# Patient Record
Sex: MALE | Race: WHITE | NOT HISPANIC OR LATINO | Employment: OTHER | URBAN - METROPOLITAN AREA
[De-identification: names, ages, dates, MRNs, and addresses within clinical notes are randomized per-mention and may not be internally consistent; named-entity substitution may affect disease eponyms.]

---

## 2017-10-24 ENCOUNTER — APPOINTMENT (OUTPATIENT)
Dept: SOCIAL WORK | Facility: CLINIC | Age: 57
End: 2017-10-24

## 2017-10-24 PROCEDURE — 90686 IIV4 VACC NO PRSV 0.5 ML IM: CPT | Performed by: REGISTERED NURSE

## 2020-01-15 ENCOUNTER — OFFICE VISIT (OUTPATIENT)
Dept: URGENT CARE | Facility: PHYSICIAN GROUP | Age: 60
End: 2020-01-15
Payer: COMMERCIAL

## 2020-01-15 VITALS
DIASTOLIC BLOOD PRESSURE: 92 MMHG | HEART RATE: 80 BPM | BODY MASS INDEX: 30.46 KG/M2 | TEMPERATURE: 98.8 F | OXYGEN SATURATION: 97 % | WEIGHT: 201 LBS | RESPIRATION RATE: 16 BRPM | HEIGHT: 68 IN | SYSTOLIC BLOOD PRESSURE: 130 MMHG

## 2020-01-15 DIAGNOSIS — R42 VERTIGO: ICD-10-CM

## 2020-01-15 DIAGNOSIS — R11.0 NAUSEA: ICD-10-CM

## 2020-01-15 PROCEDURE — 99204 OFFICE O/P NEW MOD 45 MIN: CPT | Performed by: PHYSICIAN ASSISTANT

## 2020-01-15 RX ORDER — MECLIZINE HYDROCHLORIDE 25 MG/1
25 TABLET ORAL 3 TIMES DAILY PRN
Qty: 30 TAB | Refills: 0 | Status: ON HOLD
Start: 2020-01-15 | End: 2020-12-31

## 2020-01-15 RX ORDER — ONDANSETRON 4 MG/1
4 TABLET, ORALLY DISINTEGRATING ORAL EVERY 8 HOURS PRN
Qty: 20 TAB | Refills: 0 | Status: SHIPPED | OUTPATIENT
Start: 2020-01-15 | End: 2020-01-15 | Stop reason: SDUPTHER

## 2020-01-15 RX ORDER — ONDANSETRON 4 MG/1
4 TABLET, ORALLY DISINTEGRATING ORAL EVERY 8 HOURS PRN
Qty: 20 TAB | Refills: 0 | Status: ON HOLD
Start: 2020-01-15 | End: 2020-12-31

## 2020-01-15 NOTE — LETTER
January 15, 2020         Patient: Ryan Nunez   YOB: 1960   Date of Visit: 1/15/2020           To Whom it May Concern:    Ryan Nunez was seen in my clinic on 1/15/2020. He may return to work 1/17/20.    If you have any questions or concerns, please don't hesitate to call.        Sincerely,           Crys White P.A.-C.  Electronically Signed

## 2020-01-15 NOTE — PROGRESS NOTES
Chief Complaint   Patient presents with   • Nausea     saw his dentist yesterday started having symptoms after his appointment   • Dizziness       HISTORY OF PRESENT ILLNESS: Patient is a 59 y.o. male who presents today for the following:    Patient comes in for evaluation of vertigo and nausea that started yesterday after a filling at his dentist office.  He states he has had a filling before.  He was not given any gas or anesthesia.  He was not given any injections of medications or oral medications upon discharge.  He has worsening symptoms with head movement.  He was having a difficult time standing at his standing desk, feeling like he had to hold on.  He has not had any vomiting, diarrhea, or fever.  He has not taken any over-the-counter medication.  He denies chest pain, confusion, and slurred speech.    Patient Active Problem List    Diagnosis Date Noted   • Dyslipidemia 02/21/2012   • Elevated fasting glucose 01/20/2012   • ASTHMA 01/20/2012       Allergies:Patient has no known allergies.    Current Outpatient Medications Ordered in Epic   Medication Sig Dispense Refill   • ondansetron (ZOFRAN ODT) 4 MG TABLET DISPERSIBLE Take 1 Tab by mouth every 8 hours as needed for Nausea. 20 Tab 0   • meclizine (ANTIVERT) 25 MG Tab Take 1 Tab by mouth 3 times a day as needed for Vertigo. 30 Tab 0   • meclizine (ANTIVERT) 25 MG Tab Take 1 Tab by mouth 3 times a day as needed. 30 Tab 0   • ondansetron (ZOFRAN ODT) 4 MG TABLET DISPERSIBLE Take 1 Tab by mouth every 8 hours as needed for Nausea/Vomiting. 20 Tab 0   • diazepam (VALIUM) 10 MG tablet Take 0.5-1 Tabs by mouth every 12 hours as needed (dizziness). 20 Tab 0   • Omeprazole Magnesium (PRILOSEC OTC PO) Take  by mouth.       No current Epic-ordered facility-administered medications on file.        History reviewed. No pertinent past medical history.    Social History     Tobacco Use   • Smoking status: Never Smoker   • Smokeless tobacco: Never Used   Substance Use  "Topics   • Alcohol use: No   • Drug use: Never       No family status information on file.     Family History   Problem Relation Age of Onset   • Lung Disease Father        Review of Systems:    Constitutional ROS: No unexpected change in weight, No weakness, No fatigue  Eye ROS: No recent significant change in vision, No eye pain, redness, discharge  Ear ROS: + vertigo  Mouth/Throat ROS: No teeth or gum problems, No bleeding gums, No tongue complaints  Neck ROS: No swollen glands, No significant pain in neck  Pulmonary ROS: No chronic cough, sputum, or hemoptysis, No dyspnea on exertion, No wheezing  Cardiovascular ROS: No diaphoresis, No edema, No palpitations  GI: nausea  Musculoskeletal/Extremities ROS: No peripheral edema, No pain, redness or swelling on the joints  Hematologic/Lymphatic ROS: No chills, No night sweats, No weight loss  Skin/Integumentary ROS: No edema, No evidence of rash, No itching      Exam:  /92   Pulse 80   Temp 37.1 °C (98.8 °F) (Temporal)   Resp 16   Ht 1.727 m (5' 8\")   Wt 91.2 kg (201 lb)   SpO2 97%   General: Well developed, well nourished. No distress.    Eye: PERRL/EOMI; conjunctivae clear, lids normal.  ENMT: Lips without lesions, MMM. Oropharynx is clear. Bilateral TMs are within normal limits.  Neck: No carotid bruits noted.  Pulmonary: Unlabored respiratory effort. Lungs clear to auscultation, no wheezes, no rhonchi.    Cardiovascular: Regular rate and rhythm without murmur.   Neurologic: Grossly nonfocal. No facial asymmetry noted.  No tongue deviation noted.  Negative pronator drift.   strength strong and equal bilaterally.  Lymph: No cervical lymphadenopathy noted.  Skin: Warm, dry, good turgor. No rashes in visible areas.   Psych: Normal mood. Alert and oriented to person, place and time.    Assessment/Plan:  Discussed unknown etiology.  Symptomatic medication provided.  Discussed red flags and ER precautions. Follow up for worsening or persistent " symptoms.  1. Vertigo  meclizine (ANTIVERT) 25 MG Tab   2. Nausea  ondansetron (ZOFRAN ODT) 4 MG TABLET DISPERSIBLE

## 2020-12-15 ENCOUNTER — TELEPHONE (OUTPATIENT)
Dept: SCHEDULING | Facility: IMAGING CENTER | Age: 60
End: 2020-12-15

## 2020-12-31 ENCOUNTER — APPOINTMENT (OUTPATIENT)
Dept: RADIOLOGY | Facility: MEDICAL CENTER | Age: 60
DRG: 313 | End: 2020-12-31
Attending: SURGERY
Payer: COMMERCIAL

## 2020-12-31 ENCOUNTER — APPOINTMENT (OUTPATIENT)
Dept: RADIOLOGY | Facility: MEDICAL CENTER | Age: 60
DRG: 313 | End: 2020-12-31
Attending: EMERGENCY MEDICINE
Payer: COMMERCIAL

## 2020-12-31 ENCOUNTER — HOSPITAL ENCOUNTER (INPATIENT)
Facility: MEDICAL CENTER | Age: 60
LOS: 2 days | DRG: 313 | End: 2021-01-02
Attending: EMERGENCY MEDICINE | Admitting: SURGERY
Payer: COMMERCIAL

## 2020-12-31 ENCOUNTER — APPOINTMENT (OUTPATIENT)
Dept: RADIOLOGY | Facility: MEDICAL CENTER | Age: 60
DRG: 313 | End: 2020-12-31
Attending: NURSE PRACTITIONER
Payer: COMMERCIAL

## 2020-12-31 DIAGNOSIS — S22.43XA MULTIPLE FRACTURES OF RIBS, BILATERAL, INITIAL ENCOUNTER FOR CLOSED FRACTURE: ICD-10-CM

## 2020-12-31 DIAGNOSIS — I26.99 ACUTE PULMONARY EMBOLISM WITHOUT ACUTE COR PULMONALE, UNSPECIFIED PULMONARY EMBOLISM TYPE (HCC): ICD-10-CM

## 2020-12-31 PROBLEM — F10.929 ACUTE ALCOHOL INTOXICATION (HCC): Status: ACTIVE | Noted: 2020-12-31

## 2020-12-31 PROBLEM — K21.9 GERD (GASTROESOPHAGEAL REFLUX DISEASE): Status: ACTIVE | Noted: 2020-12-31

## 2020-12-31 PROBLEM — T14.90XA TRAUMA: Status: ACTIVE | Noted: 2020-12-31

## 2020-12-31 PROBLEM — Z11.9 SCREENING EXAMINATION FOR INFECTIOUS DISEASE: Status: ACTIVE | Noted: 2020-12-31

## 2020-12-31 PROBLEM — Z53.09 CONTRAINDICATION TO DEEP VEIN THROMBOSIS (DVT) PROPHYLAXIS: Status: ACTIVE | Noted: 2020-12-31

## 2020-12-31 PROBLEM — N32.89 BLADDER WALL THICKENING: Status: ACTIVE | Noted: 2020-12-31

## 2020-12-31 LAB
ABO GROUP BLD: NORMAL
ALBUMIN SERPL BCP-MCNC: 4.4 G/DL (ref 3.2–4.9)
ALBUMIN/GLOB SERPL: 1.4 G/DL
ALP SERPL-CCNC: 99 U/L (ref 30–99)
ALT SERPL-CCNC: 58 U/L (ref 2–50)
ANION GAP SERPL CALC-SCNC: 12 MMOL/L (ref 7–16)
APTT PPP: 31.2 SEC (ref 24.7–36)
AST SERPL-CCNC: 39 U/L (ref 12–45)
BASOPHILS # BLD AUTO: 0.4 % (ref 0–1.8)
BASOPHILS # BLD: 0.06 K/UL (ref 0–0.12)
BILIRUB SERPL-MCNC: 1 MG/DL (ref 0.1–1.5)
BLD GP AB SCN SERPL QL: NORMAL
BUN SERPL-MCNC: 14 MG/DL (ref 8–22)
CALCIUM SERPL-MCNC: 9.4 MG/DL (ref 8.5–10.5)
CHLORIDE SERPL-SCNC: 99 MMOL/L (ref 96–112)
CO2 SERPL-SCNC: 25 MMOL/L (ref 20–33)
COVID ORDER STATUS COVID19: NORMAL
CREAT SERPL-MCNC: 1.11 MG/DL (ref 0.5–1.4)
EKG IMPRESSION: NORMAL
EOSINOPHIL # BLD AUTO: 0.02 K/UL (ref 0–0.51)
EOSINOPHIL NFR BLD: 0.1 % (ref 0–6.9)
ERYTHROCYTE [DISTWIDTH] IN BLOOD BY AUTOMATED COUNT: 43.3 FL (ref 35.9–50)
ETHANOL BLD-MCNC: 24.2 MG/DL (ref 0–10)
FLUAV RNA SPEC QL NAA+PROBE: NEGATIVE
FLUBV RNA SPEC QL NAA+PROBE: NEGATIVE
GLOBULIN SER CALC-MCNC: 3.1 G/DL (ref 1.9–3.5)
GLUCOSE SERPL-MCNC: 244 MG/DL (ref 65–99)
HCT VFR BLD AUTO: 49.3 % (ref 42–52)
HGB BLD-MCNC: 17 G/DL (ref 14–18)
IMM GRANULOCYTES # BLD AUTO: 0.1 K/UL (ref 0–0.11)
IMM GRANULOCYTES NFR BLD AUTO: 0.6 % (ref 0–0.9)
INR PPP: 1.03 (ref 0.87–1.13)
LIPASE SERPL-CCNC: 16 U/L (ref 11–82)
LYMPHOCYTES # BLD AUTO: 1.52 K/UL (ref 1–4.8)
LYMPHOCYTES NFR BLD: 9.6 % (ref 22–41)
MCH RBC QN AUTO: 31.7 PG (ref 27–33)
MCHC RBC AUTO-ENTMCNC: 34.5 G/DL (ref 33.7–35.3)
MCV RBC AUTO: 92 FL (ref 81.4–97.8)
MONOCYTES # BLD AUTO: 1.12 K/UL (ref 0–0.85)
MONOCYTES NFR BLD AUTO: 7.1 % (ref 0–13.4)
NEUTROPHILS # BLD AUTO: 12.97 K/UL (ref 1.82–7.42)
NEUTROPHILS NFR BLD: 82.2 % (ref 44–72)
NRBC # BLD AUTO: 0 K/UL
NRBC BLD-RTO: 0 /100 WBC
PLATELET # BLD AUTO: 388 K/UL (ref 164–446)
PMV BLD AUTO: 10.5 FL (ref 9–12.9)
POTASSIUM SERPL-SCNC: 3.9 MMOL/L (ref 3.6–5.5)
PROT SERPL-MCNC: 7.5 G/DL (ref 6–8.2)
PROTHROMBIN TIME: 13.8 SEC (ref 12–14.6)
RBC # BLD AUTO: 5.36 M/UL (ref 4.7–6.1)
RH BLD: NORMAL
RSV RNA SPEC QL NAA+PROBE: NEGATIVE
SARS-COV-2 RNA RESP QL NAA+PROBE: NOTDETECTED
SODIUM SERPL-SCNC: 136 MMOL/L (ref 135–145)
SPECIMEN SOURCE: NORMAL
TROPONIN T SERPL-MCNC: 20 NG/L (ref 6–19)
WBC # BLD AUTO: 15.8 K/UL (ref 4.8–10.8)

## 2020-12-31 PROCEDURE — 93970 EXTREMITY STUDY: CPT

## 2020-12-31 PROCEDURE — 99291 CRITICAL CARE FIRST HOUR: CPT

## 2020-12-31 PROCEDURE — 36415 COLL VENOUS BLD VENIPUNCTURE: CPT

## 2020-12-31 PROCEDURE — 86900 BLOOD TYPING SEROLOGIC ABO: CPT

## 2020-12-31 PROCEDURE — 700117 HCHG RX CONTRAST REV CODE 255: Performed by: EMERGENCY MEDICINE

## 2020-12-31 PROCEDURE — 307740 HCHG GREEN TRAUMA TEAM SERVICES

## 2020-12-31 PROCEDURE — A9270 NON-COVERED ITEM OR SERVICE: HCPCS | Performed by: NURSE PRACTITIONER

## 2020-12-31 PROCEDURE — 71260 CT THORAX DX C+: CPT

## 2020-12-31 PROCEDURE — 85610 PROTHROMBIN TIME: CPT

## 2020-12-31 PROCEDURE — 700102 HCHG RX REV CODE 250 W/ 637 OVERRIDE(OP): Performed by: SURGERY

## 2020-12-31 PROCEDURE — 770022 HCHG ROOM/CARE - ICU (200)

## 2020-12-31 PROCEDURE — 84484 ASSAY OF TROPONIN QUANT: CPT

## 2020-12-31 PROCEDURE — 80307 DRUG TEST PRSMV CHEM ANLYZR: CPT

## 2020-12-31 PROCEDURE — 700102 HCHG RX REV CODE 250 W/ 637 OVERRIDE(OP): Performed by: NURSE PRACTITIONER

## 2020-12-31 PROCEDURE — 93005 ELECTROCARDIOGRAM TRACING: CPT | Performed by: EMERGENCY MEDICINE

## 2020-12-31 PROCEDURE — A9270 NON-COVERED ITEM OR SERVICE: HCPCS | Performed by: SURGERY

## 2020-12-31 PROCEDURE — 72131 CT LUMBAR SPINE W/O DYE: CPT

## 2020-12-31 PROCEDURE — 700111 HCHG RX REV CODE 636 W/ 250 OVERRIDE (IP): Performed by: EMERGENCY MEDICINE

## 2020-12-31 PROCEDURE — 700105 HCHG RX REV CODE 258: Performed by: SURGERY

## 2020-12-31 PROCEDURE — 80053 COMPREHEN METABOLIC PANEL: CPT

## 2020-12-31 PROCEDURE — 700111 HCHG RX REV CODE 636 W/ 250 OVERRIDE (IP): Performed by: SURGERY

## 2020-12-31 PROCEDURE — 70450 CT HEAD/BRAIN W/O DYE: CPT

## 2020-12-31 PROCEDURE — 72128 CT CHEST SPINE W/O DYE: CPT

## 2020-12-31 PROCEDURE — 86850 RBC ANTIBODY SCREEN: CPT

## 2020-12-31 PROCEDURE — 85025 COMPLETE CBC W/AUTO DIFF WBC: CPT

## 2020-12-31 PROCEDURE — 73560 X-RAY EXAM OF KNEE 1 OR 2: CPT | Mod: RT

## 2020-12-31 PROCEDURE — 83690 ASSAY OF LIPASE: CPT

## 2020-12-31 PROCEDURE — 96374 THER/PROPH/DIAG INJ IV PUSH: CPT

## 2020-12-31 PROCEDURE — 86901 BLOOD TYPING SEROLOGIC RH(D): CPT

## 2020-12-31 PROCEDURE — 0241U HCHG SARS-COV-2 COVID-19 NFCT DS RESP RNA 4 TRGT MIC: CPT

## 2020-12-31 PROCEDURE — 51798 US URINE CAPACITY MEASURE: CPT

## 2020-12-31 PROCEDURE — 85730 THROMBOPLASTIN TIME PARTIAL: CPT

## 2020-12-31 PROCEDURE — C9803 HOPD COVID-19 SPEC COLLECT: HCPCS | Performed by: NURSE PRACTITIONER

## 2020-12-31 PROCEDURE — 94760 N-INVAS EAR/PLS OXIMETRY 1: CPT

## 2020-12-31 RX ORDER — SODIUM CHLORIDE, SODIUM LACTATE, POTASSIUM CHLORIDE, CALCIUM CHLORIDE 600; 310; 30; 20 MG/100ML; MG/100ML; MG/100ML; MG/100ML
INJECTION, SOLUTION INTRAVENOUS CONTINUOUS
Status: DISCONTINUED | OUTPATIENT
Start: 2020-12-31 | End: 2021-01-01 | Stop reason: ALTCHOICE

## 2020-12-31 RX ORDER — AMOXICILLIN 250 MG
1 CAPSULE ORAL
Status: DISCONTINUED | OUTPATIENT
Start: 2020-12-31 | End: 2021-01-02 | Stop reason: HOSPADM

## 2020-12-31 RX ORDER — OXYCODONE HYDROCHLORIDE 10 MG/1
10 TABLET ORAL
Status: DISCONTINUED | OUTPATIENT
Start: 2020-12-31 | End: 2021-01-02 | Stop reason: HOSPADM

## 2020-12-31 RX ORDER — KETOROLAC TROMETHAMINE 30 MG/ML
30 INJECTION, SOLUTION INTRAMUSCULAR; INTRAVENOUS EVERY 6 HOURS
Status: DISCONTINUED | OUTPATIENT
Start: 2020-12-31 | End: 2021-01-02 | Stop reason: HOSPADM

## 2020-12-31 RX ORDER — OXYCODONE HYDROCHLORIDE 5 MG/1
5 TABLET ORAL
Status: DISCONTINUED | OUTPATIENT
Start: 2020-12-31 | End: 2021-01-02 | Stop reason: HOSPADM

## 2020-12-31 RX ORDER — ALBUTEROL SULFATE 90 UG/1
2 AEROSOL, METERED RESPIRATORY (INHALATION) EVERY 6 HOURS PRN
COMMUNITY
End: 2021-03-25

## 2020-12-31 RX ORDER — ACETAMINOPHEN 500 MG
1000 TABLET ORAL EVERY 6 HOURS
Status: DISCONTINUED | OUTPATIENT
Start: 2020-12-31 | End: 2021-01-02 | Stop reason: HOSPADM

## 2020-12-31 RX ORDER — ONDANSETRON 2 MG/ML
4 INJECTION INTRAMUSCULAR; INTRAVENOUS EVERY 4 HOURS PRN
Status: DISCONTINUED | OUTPATIENT
Start: 2020-12-31 | End: 2021-01-02 | Stop reason: HOSPADM

## 2020-12-31 RX ORDER — ENEMA 19; 7 G/133ML; G/133ML
1 ENEMA RECTAL
Status: DISCONTINUED | OUTPATIENT
Start: 2020-12-31 | End: 2021-01-02 | Stop reason: HOSPADM

## 2020-12-31 RX ORDER — DOCUSATE SODIUM 100 MG/1
100 CAPSULE, LIQUID FILLED ORAL 2 TIMES DAILY
Status: DISCONTINUED | OUTPATIENT
Start: 2020-12-31 | End: 2021-01-02 | Stop reason: HOSPADM

## 2020-12-31 RX ORDER — AMOXICILLIN 250 MG
1 CAPSULE ORAL NIGHTLY
Status: DISCONTINUED | OUTPATIENT
Start: 2020-12-31 | End: 2021-01-01

## 2020-12-31 RX ORDER — POLYETHYLENE GLYCOL 3350 17 G/17G
1 POWDER, FOR SOLUTION ORAL 2 TIMES DAILY
Status: DISCONTINUED | OUTPATIENT
Start: 2020-12-31 | End: 2021-01-02 | Stop reason: HOSPADM

## 2020-12-31 RX ORDER — MORPHINE SULFATE 4 MG/ML
2 INJECTION, SOLUTION INTRAMUSCULAR; INTRAVENOUS ONCE
Status: ACTIVE | OUTPATIENT
Start: 2020-12-31 | End: 2021-01-01

## 2020-12-31 RX ORDER — FAMOTIDINE 20 MG/1
20 TABLET, FILM COATED ORAL 2 TIMES DAILY
Status: DISCONTINUED | OUTPATIENT
Start: 2020-12-31 | End: 2021-01-02 | Stop reason: HOSPADM

## 2020-12-31 RX ORDER — MORPHINE SULFATE 4 MG/ML
4 INJECTION, SOLUTION INTRAMUSCULAR; INTRAVENOUS
Status: DISCONTINUED | OUTPATIENT
Start: 2020-12-31 | End: 2021-01-02 | Stop reason: HOSPADM

## 2020-12-31 RX ORDER — FAMOTIDINE 20 MG/1
20 TABLET, FILM COATED ORAL 2 TIMES DAILY
Status: DISCONTINUED | OUTPATIENT
Start: 2020-12-31 | End: 2020-12-31

## 2020-12-31 RX ORDER — BISACODYL 10 MG
10 SUPPOSITORY, RECTAL RECTAL
Status: DISCONTINUED | OUTPATIENT
Start: 2020-12-31 | End: 2021-01-02 | Stop reason: HOSPADM

## 2020-12-31 RX ADMIN — ACETAMINOPHEN 1000 MG: 500 TABLET ORAL at 17:13

## 2020-12-31 RX ADMIN — IOHEXOL 100 ML: 350 INJECTION, SOLUTION INTRAVENOUS at 10:20

## 2020-12-31 RX ADMIN — KETOROLAC TROMETHAMINE 30 MG: 30 INJECTION, SOLUTION INTRAMUSCULAR at 12:40

## 2020-12-31 RX ADMIN — KETOROLAC TROMETHAMINE 30 MG: 30 INJECTION, SOLUTION INTRAMUSCULAR at 17:13

## 2020-12-31 RX ADMIN — SODIUM CHLORIDE, POTASSIUM CHLORIDE, SODIUM LACTATE AND CALCIUM CHLORIDE: 600; 310; 30; 20 INJECTION, SOLUTION INTRAVENOUS at 12:35

## 2020-12-31 RX ADMIN — ENOXAPARIN SODIUM 80 MG: 40 INJECTION SUBCUTANEOUS at 17:13

## 2020-12-31 RX ADMIN — KETOROLAC TROMETHAMINE 30 MG: 30 INJECTION, SOLUTION INTRAMUSCULAR at 23:34

## 2020-12-31 RX ADMIN — ACETAMINOPHEN 1000 MG: 500 TABLET ORAL at 12:40

## 2020-12-31 RX ADMIN — SODIUM CHLORIDE, POTASSIUM CHLORIDE, SODIUM LACTATE AND CALCIUM CHLORIDE: 600; 310; 30; 20 INJECTION, SOLUTION INTRAVENOUS at 22:13

## 2020-12-31 RX ADMIN — FAMOTIDINE 20 MG: 20 TABLET ORAL at 17:13

## 2020-12-31 RX ADMIN — ENOXAPARIN SODIUM 80 MG: 40 INJECTION SUBCUTANEOUS at 12:39

## 2020-12-31 RX ADMIN — FENTANYL CITRATE 50 MCG: 50 INJECTION, SOLUTION INTRAMUSCULAR; INTRAVENOUS at 09:49

## 2020-12-31 ASSESSMENT — LIFESTYLE VARIABLES
DO YOU DRINK ALCOHOL: NO
TOTAL SCORE: 0
ON A TYPICAL DAY WHEN YOU DRINK ALCOHOL HOW MANY DRINKS DO YOU HAVE: 0
HAVE YOU EVER FELT YOU SHOULD CUT DOWN ON YOUR DRINKING: NO
TOTAL SCORE: 0
HOW MANY TIMES IN THE PAST YEAR HAVE YOU HAD 5 OR MORE DRINKS IN A DAY: 0
AVERAGE NUMBER OF DAYS PER WEEK YOU HAVE A DRINK CONTAINING ALCOHOL: 0
HAVE PEOPLE ANNOYED YOU BY CRITICIZING YOUR DRINKING: NO
TOTAL SCORE: 0
ALCOHOL_USE: NO
EVER HAD A DRINK FIRST THING IN THE MORNING TO STEADY YOUR NERVES TO GET RID OF A HANGOVER: NO
EVER FELT BAD OR GUILTY ABOUT YOUR DRINKING: NO
CONSUMPTION TOTAL: NEGATIVE

## 2020-12-31 ASSESSMENT — FIBROSIS 4 INDEX: FIB4 SCORE: 0.81

## 2020-12-31 ASSESSMENT — COPD QUESTIONNAIRES
COPD SCREENING SCORE: 1
DURING THE PAST 4 WEEKS HOW MUCH DID YOU FEEL SHORT OF BREATH: NONE/LITTLE OF THE TIME
HAVE YOU SMOKED AT LEAST 100 CIGARETTES IN YOUR ENTIRE LIFE: NO/DON'T KNOW
DO YOU EVER COUGH UP ANY MUCUS OR PHLEGM?: NO/ONLY WITH OCCASIONAL COLDS OR INFECTIONS

## 2020-12-31 ASSESSMENT — PAIN DESCRIPTION - PAIN TYPE: TYPE: ACUTE PAIN

## 2020-12-31 NOTE — ED NOTES
Med Rec completed per patient and wife  Allergies reviewed  No ORAL antibiotics in last 14 days
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
## 2020-12-31 NOTE — ASSESSMENT & PLAN NOTE
BA on arrival 24.2  Alcohol withdrawal surveillance.  Brief intervention completed, pt states it is BA is wrong and he never drinks.
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
## 2020-12-31 NOTE — ASSESSMENT & PLAN NOTE
Mildly thick-walled bladder can be related to obstructive uropathy in the setting of a prominent prostate.  Bladder scan protocol.  1/1/21 voiding.

## 2020-12-31 NOTE — ASSESSMENT & PLAN NOTE
Bilateral rib fractures involving the left fourth, fifth, sixth, seventh and eighth ribs, and right sixth, seventh and possibly eighth ribs.  Supplemental oxygen to keep saturation >93%.  Aggressive pulmonary hygiene and oyxgen saturation monitor.  Incentive spirometer to bedside, encourage Q1hour use while awake.  1/1/21 IS 1000  Serial Chest Xray's.

## 2020-12-31 NOTE — PROGRESS NOTES
1220: assumed care of patient in ED  1231: arrived to ICU    75 SR  164/95  92% on 2L NC  98 Temporal  87.2 KG    9/10 pain // refusing narcotics    _________________    2 RN skin check completed with Dorcas     Areas of concern/Skin observations:  · Abrasion to forehead  · Abrasions to abdomen  · Abrasions to bilateral hands  · Abrasions to bilateral knees, left shin    Devices in use, assessed under and interventions (as appropriate) for skin protection:  · SCDs, BP cuff, SaO2 monitor  · Nasal cannula     Interventions in place such as:   · Pt turning in bed  · Keeping skin clean and dry  · Waffle cushion while OOB: n/a at this time  · Bed Type: Low air loss  · Mobility: stand; EOB    _________________________________    Pt belongings:   · Mask  · Underwear / blue / on patient  · Glasses  · Red fuzzy jacket  · Gray shirt  · White socks  · Phone and white dual   · Wallet / blue  · Black pants  · Comb  · Keys  · Cash - $41.00  · Brown shoes

## 2020-12-31 NOTE — ASSESSMENT & PLAN NOTE
Left lower lobe pulmonary emboli.  DVT studies negative for DVT.  Therapeutic lovenox initiated on admit.

## 2021-01-01 ENCOUNTER — APPOINTMENT (OUTPATIENT)
Dept: RADIOLOGY | Facility: MEDICAL CENTER | Age: 61
DRG: 313 | End: 2021-01-01
Attending: SURGERY
Payer: COMMERCIAL

## 2021-01-01 PROBLEM — Z78.9 NO CONTRAINDICATION TO ANTICOAGULATION THERAPY: Status: ACTIVE | Noted: 2020-12-31

## 2021-01-01 PROBLEM — F10.929 ACUTE ALCOHOL INTOXICATION (HCC): Status: RESOLVED | Noted: 2020-12-31 | Resolved: 2021-01-01

## 2021-01-01 LAB
ABO + RH BLD: NORMAL
ALBUMIN SERPL BCP-MCNC: 3.5 G/DL (ref 3.2–4.9)
ALBUMIN/GLOB SERPL: 1.5 G/DL
ALP SERPL-CCNC: 85 U/L (ref 30–99)
ALT SERPL-CCNC: 44 U/L (ref 2–50)
ANION GAP SERPL CALC-SCNC: 7 MMOL/L (ref 7–16)
AST SERPL-CCNC: 34 U/L (ref 12–45)
BASOPHILS # BLD AUTO: 0.6 % (ref 0–1.8)
BASOPHILS # BLD: 0.05 K/UL (ref 0–0.12)
BILIRUB SERPL-MCNC: 0.9 MG/DL (ref 0.1–1.5)
BUN SERPL-MCNC: 12 MG/DL (ref 8–22)
CALCIUM SERPL-MCNC: 8.5 MG/DL (ref 8.5–10.5)
CHLORIDE SERPL-SCNC: 103 MMOL/L (ref 96–112)
CO2 SERPL-SCNC: 28 MMOL/L (ref 20–33)
CREAT SERPL-MCNC: 1.06 MG/DL (ref 0.5–1.4)
EOSINOPHIL # BLD AUTO: 0.16 K/UL (ref 0–0.51)
EOSINOPHIL NFR BLD: 1.9 % (ref 0–6.9)
ERYTHROCYTE [DISTWIDTH] IN BLOOD BY AUTOMATED COUNT: 43.9 FL (ref 35.9–50)
GLOBULIN SER CALC-MCNC: 2.3 G/DL (ref 1.9–3.5)
GLUCOSE SERPL-MCNC: 143 MG/DL (ref 65–99)
HCT VFR BLD AUTO: 43.6 % (ref 42–52)
HGB BLD-MCNC: 14.7 G/DL (ref 14–18)
IMM GRANULOCYTES # BLD AUTO: 0.03 K/UL (ref 0–0.11)
IMM GRANULOCYTES NFR BLD AUTO: 0.4 % (ref 0–0.9)
LYMPHOCYTES # BLD AUTO: 1.97 K/UL (ref 1–4.8)
LYMPHOCYTES NFR BLD: 23.6 % (ref 22–41)
MCH RBC QN AUTO: 31.7 PG (ref 27–33)
MCHC RBC AUTO-ENTMCNC: 33.7 G/DL (ref 33.7–35.3)
MCV RBC AUTO: 94 FL (ref 81.4–97.8)
MONOCYTES # BLD AUTO: 0.72 K/UL (ref 0–0.85)
MONOCYTES NFR BLD AUTO: 8.6 % (ref 0–13.4)
NEUTROPHILS # BLD AUTO: 5.4 K/UL (ref 1.82–7.42)
NEUTROPHILS NFR BLD: 64.9 % (ref 44–72)
NRBC # BLD AUTO: 0 K/UL
NRBC BLD-RTO: 0 /100 WBC
PLATELET # BLD AUTO: 241 K/UL (ref 164–446)
PMV BLD AUTO: 10.7 FL (ref 9–12.9)
POTASSIUM SERPL-SCNC: 4.2 MMOL/L (ref 3.6–5.5)
PROT SERPL-MCNC: 5.8 G/DL (ref 6–8.2)
RBC # BLD AUTO: 4.64 M/UL (ref 4.7–6.1)
SODIUM SERPL-SCNC: 138 MMOL/L (ref 135–145)
WBC # BLD AUTO: 8.3 K/UL (ref 4.8–10.8)

## 2021-01-01 PROCEDURE — 85025 COMPLETE CBC W/AUTO DIFF WBC: CPT

## 2021-01-01 PROCEDURE — 700102 HCHG RX REV CODE 250 W/ 637 OVERRIDE(OP): Performed by: SURGERY

## 2021-01-01 PROCEDURE — 71045 X-RAY EXAM CHEST 1 VIEW: CPT

## 2021-01-01 PROCEDURE — 80053 COMPREHEN METABOLIC PANEL: CPT

## 2021-01-01 PROCEDURE — 700111 HCHG RX REV CODE 636 W/ 250 OVERRIDE (IP): Performed by: SURGERY

## 2021-01-01 PROCEDURE — A9270 NON-COVERED ITEM OR SERVICE: HCPCS | Performed by: SURGERY

## 2021-01-01 PROCEDURE — 99232 SBSQ HOSP IP/OBS MODERATE 35: CPT | Performed by: SURGERY

## 2021-01-01 PROCEDURE — 700102 HCHG RX REV CODE 250 W/ 637 OVERRIDE(OP): Performed by: NURSE PRACTITIONER

## 2021-01-01 PROCEDURE — A9270 NON-COVERED ITEM OR SERVICE: HCPCS | Performed by: NURSE PRACTITIONER

## 2021-01-01 PROCEDURE — 770001 HCHG ROOM/CARE - MED/SURG/GYN PRIV*

## 2021-01-01 RX ADMIN — FAMOTIDINE 20 MG: 20 TABLET ORAL at 05:13

## 2021-01-01 RX ADMIN — CHOLECALCIFEROL (VITAMIN D3) 10 MCG (400 UNIT) TABLET 400 UNITS: at 05:14

## 2021-01-01 RX ADMIN — ACETAMINOPHEN 1000 MG: 500 TABLET ORAL at 05:13

## 2021-01-01 RX ADMIN — ENOXAPARIN SODIUM 80 MG: 40 INJECTION SUBCUTANEOUS at 05:14

## 2021-01-01 RX ADMIN — KETOROLAC TROMETHAMINE 30 MG: 30 INJECTION, SOLUTION INTRAMUSCULAR at 05:14

## 2021-01-01 RX ADMIN — ACETAMINOPHEN 1000 MG: 500 TABLET ORAL at 15:05

## 2021-01-01 RX ADMIN — ACETAMINOPHEN 1000 MG: 500 TABLET ORAL at 22:16

## 2021-01-01 RX ADMIN — THERA TABS 1 TABLET: TAB at 05:13

## 2021-01-01 RX ADMIN — ENOXAPARIN SODIUM 80 MG: 40 INJECTION SUBCUTANEOUS at 19:29

## 2021-01-01 ASSESSMENT — ENCOUNTER SYMPTOMS
NAUSEA: 0
SPEECH CHANGE: 0
FEVER: 0
BLURRED VISION: 0
MYALGIAS: 1
VOMITING: 0
SENSORY CHANGE: 0
ABDOMINAL PAIN: 0

## 2021-01-01 ASSESSMENT — PAIN DESCRIPTION - PAIN TYPE
TYPE: ACUTE PAIN

## 2021-01-01 ASSESSMENT — LIFESTYLE VARIABLES: SUBSTANCE_ABUSE: 1
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
## 2021-01-02 ENCOUNTER — APPOINTMENT (OUTPATIENT)
Dept: RADIOLOGY | Facility: MEDICAL CENTER | Age: 61
DRG: 313 | End: 2021-01-02
Attending: SURGERY
Payer: COMMERCIAL

## 2021-01-02 ENCOUNTER — PHARMACY VISIT (OUTPATIENT)
Dept: PHARMACY | Facility: MEDICAL CENTER | Age: 61
End: 2021-01-02
Payer: COMMERCIAL

## 2021-01-02 VITALS
RESPIRATION RATE: 18 BRPM | TEMPERATURE: 97.6 F | DIASTOLIC BLOOD PRESSURE: 101 MMHG | HEIGHT: 68 IN | WEIGHT: 194.45 LBS | OXYGEN SATURATION: 92 % | HEART RATE: 86 BPM | BODY MASS INDEX: 29.47 KG/M2 | SYSTOLIC BLOOD PRESSURE: 149 MMHG

## 2021-01-02 PROCEDURE — 700102 HCHG RX REV CODE 250 W/ 637 OVERRIDE(OP): Performed by: SURGERY

## 2021-01-02 PROCEDURE — 71045 X-RAY EXAM CHEST 1 VIEW: CPT

## 2021-01-02 PROCEDURE — A9270 NON-COVERED ITEM OR SERVICE: HCPCS | Performed by: SURGERY

## 2021-01-02 PROCEDURE — 700102 HCHG RX REV CODE 250 W/ 637 OVERRIDE(OP): Performed by: NURSE PRACTITIONER

## 2021-01-02 PROCEDURE — RXMED WILLOW AMBULATORY MEDICATION CHARGE: Performed by: NURSE PRACTITIONER

## 2021-01-02 PROCEDURE — 700111 HCHG RX REV CODE 636 W/ 250 OVERRIDE (IP): Performed by: SURGERY

## 2021-01-02 PROCEDURE — A9270 NON-COVERED ITEM OR SERVICE: HCPCS | Performed by: NURSE PRACTITIONER

## 2021-01-02 PROCEDURE — 700101 HCHG RX REV CODE 250: Performed by: SURGERY

## 2021-01-02 RX ORDER — ACETAMINOPHEN 500 MG
1000 TABLET ORAL EVERY 6 HOURS
Qty: 30 TAB | Refills: 0 | COMMUNITY
Start: 2021-01-02 | End: 2021-03-25

## 2021-01-02 RX ORDER — OXYCODONE HYDROCHLORIDE 5 MG/1
5 TABLET ORAL EVERY 6 HOURS PRN
Qty: 10 TAB | Refills: 0 | Status: SHIPPED | OUTPATIENT
Start: 2021-01-02 | End: 2021-01-05

## 2021-01-02 RX ADMIN — KETOROLAC TROMETHAMINE 30 MG: 30 INJECTION, SOLUTION INTRAMUSCULAR at 05:44

## 2021-01-02 RX ADMIN — DOCUSATE SODIUM 100 MG: 100 CAPSULE, LIQUID FILLED ORAL at 05:44

## 2021-01-02 RX ADMIN — ENOXAPARIN SODIUM 80 MG: 40 INJECTION SUBCUTANEOUS at 05:42

## 2021-01-02 RX ADMIN — ACETAMINOPHEN 1000 MG: 500 TABLET ORAL at 11:41

## 2021-01-02 RX ADMIN — ACETAMINOPHEN 1000 MG: 500 TABLET ORAL at 05:43

## 2021-01-02 RX ADMIN — FAMOTIDINE 20 MG: 20 TABLET ORAL at 05:42

## 2021-01-02 RX ADMIN — MAGNESIUM HYDROXIDE 30 ML: 400 SUSPENSION ORAL at 05:44

## 2021-01-02 RX ADMIN — THERA TABS 1 TABLET: TAB at 05:42

## 2021-01-02 RX ADMIN — KETOROLAC TROMETHAMINE 30 MG: 30 INJECTION, SOLUTION INTRAMUSCULAR at 11:42

## 2021-01-02 RX ADMIN — CHOLECALCIFEROL (VITAMIN D3) 10 MCG (400 UNIT) TABLET 400 UNITS: at 05:42

## 2021-01-02 RX ADMIN — POLYETHYLENE GLYCOL 3350 1 PACKET: 17 POWDER, FOR SOLUTION ORAL at 05:44

## 2021-01-02 ASSESSMENT — PAIN DESCRIPTION - PAIN TYPE
TYPE: ACUTE PAIN
TYPE: ACUTE PAIN

## 2021-01-02 ASSESSMENT — ENCOUNTER SYMPTOMS
NEUROLOGICAL NEGATIVE: 1
PSYCHIATRIC NEGATIVE: 1
RESPIRATORY NEGATIVE: 1
MYALGIAS: 1

## 2021-01-02 ASSESSMENT — FIBROSIS 4 INDEX: FIB4 SCORE: 1.3

## 2021-01-02 NOTE — PROGRESS NOTES
2 RN skin check complete.   Devices in place: NA.   Skin assessed under devices: NA.     Scattered bruising/abrasion throughout. Most notable scabbing/bruising/abrasion to right anterior forehead, right knee. Left hand swelling noted. Right buttocks small crescent shaped bruising; blanching. Dry, cracked heel.    Confirmed pressure ulcers found on: NA.   New potential pressure ulcers noted on: NA.    Wound consult placed: NA.     The following interventions in place: Pillows for limb positioning.

## 2021-01-02 NOTE — PROGRESS NOTES
Received report from ICU RN; assumed care. Patient arrived via  with transport at approximately 0200 a.m.. Patient up SBA with steady gait, steady gait noted. A&O x 4. VSS. 93% on RA upon initial. Patient c/o mld SOB, 1L NC added due to fluctuating saturations. Patient denied numbness, tingling, nausea, vomiting. Scattered abrasions noted throughout; see skin note PN. Abdomen round, distended. Hypoactive BS x 4. Neuro check performed, noted swaying with scanning, patient grabbed rails with PERRLA assessment. Redden hue to skin. + void per report. + flatus. LBM 01/01/2021. Patient oriented to floor/routine/call light usage. POC discussed. Questions answered. Bed alarm on/engaged. Call light/personal belongings within reach. All needs met/patient resting at present time.

## 2021-01-02 NOTE — CARE PLAN
Problem: Communication  Goal: The ability to communicate needs accurately and effectively will improve  Outcome: PROGRESSING AS EXPECTED  Patient able to articulate needs. Call light use demonstrated.     Problem: Safety  Goal: Will remain free from injury  Outcome: PROGRESSING AS EXPECTED  Educated about fall risk. Request patient let staff know when getting OOB.     Problem: Venous Thromboembolism (VTW)/Deep Vein Thrombosis (DVT) Prevention:  Goal: Patient will participate in Venous Thrombosis (VTE)/Deep Vein Thrombosis (DVT)Prevention Measures  Outcome: PROGRESSING AS EXPECTED  Discussed PE/thrombosis and risks. Verbalized understanding.     Problem: Pain Management  Goal: Pain level will decrease to patient's comfort goal  Outcome: PROGRESSING AS EXPECTED  Patient tolerating, requesting Tylenol only. Avoiding opioids consciously.
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
## 2021-01-02 NOTE — PROGRESS NOTES
Report given to TEAGAN Hutson. Transport to take patient to [place] [id number] via wheelchair. Personal items and chart with patient.

## 2021-01-02 NOTE — DISCHARGE PLANNING
Meds-to-Beds: Discharge prescription orders listed below delivered to patient's bedside by Felipa. RN notified. Patient counseled by phone.       Ryan Nunez   Home Medication Instructions BLANQUITA:[id number]    Printed on:01/02/21 1514   Medication Information                      oxyCODONE immediate-release (ROXICODONE) 5 MG Tab  Take 1 Tab by mouth every 6 hours as needed for up to 7 days.             rivaroxaban (XARELTO) 15 MG Tab tablet  Take 1 Tab by mouth 2 times a day, with meals.             rivaroxaban (XARELTO) 20 MG Tab tablet  Take 1 Tab by mouth with dinner.                 Susanne Gonzalez, PharmD

## 2021-01-02 NOTE — PROGRESS NOTES
Pt is refusing the pulse ox machine, he states he doesn't need to wear it. I educated him and he is still refusing it. Pt also wants to continue to walk around the room without assistance. I told him he needed to call for help and wait for someone to assist him. He states in ICU they let him walk around on his own and he doesn't feel like he needs our help. I educated him on fall risks and he states he understands but wants to continue to walk around on his own.

## 2021-01-02 NOTE — DISCHARGE INSTRUCTIONS
Rivaroxaban oral tablets  What is this medicine?  RIVAROXABAN (ri va ELLE a ban) is an anticoagulant (blood thinner). It is used to treat blood clots in the lungs or in the veins. It is also used to prevent blood clots in the lungs or in the veins. It is also used to lower the chance of stroke in people with a medical condition called atrial fibrillation.  This medicine may be used for other purposes; ask your health care provider or pharmacist if you have questions.  COMMON BRAND NAME(S): Xarelto, Xarelto Starter Pack  What should I tell my health care provider before I take this medicine?  They need to know if you have any of these conditions:  · antiphospholipid antibody syndrome  · artificial heart valve  · bleeding disorders  · bleeding in the brain  · blood in your stools (black or tarry stools) or if you have blood in your vomit  · history of blood clots  · history of stomach bleeding  · kidney disease  · liver disease  · low blood counts, like low white cell, platelet, or red cell counts  · recent or planned spinal or epidural procedure  · take medicines that treat or prevent blood clots  · an unusual or allergic reaction to rivaroxaban, other medicines, foods, dyes, or preservatives  · pregnant or trying to get pregnant  · breast-feeding  How should I use this medicine?  Take this medicine by mouth with a glass of water. Follow the directions on the prescription label. Take your medicine at regular intervals. Do not take it more often than directed. Do not stop taking except on your doctor's advice. Stopping this medicine may increase your risk of a blood clot. Be sure to refill your prescription before you run out of medicine.  If you are taking this medicine after hip or knee replacement surgery, take it with or without food. If you are taking this medicine for atrial fibrillation, take it with your evening meal. If you are taking this medicine to treat blood clots, take it with food at the same time each  day. If you are unable to swallow your tablet, you may crush the tablet and mix it in applesauce. Then, immediately eat the applesauce. You should eat more food right after you eat the applesauce containing the crushed tablet.  Talk to your pediatrician regarding the use of this medicine in children. Special care may be needed.  Overdosage: If you think you have taken too much of this medicine contact a poison control center or emergency room at once.  NOTE: This medicine is only for you. Do not share this medicine with others.  What if I miss a dose?  If you take your medicine once a day and miss a dose, take the missed dose as soon as you remember. If it is almost time for your next dose, take only that dose. Do not take double or extra doses.  If you take your medicine twice a day and miss a dose, take the missed dose immediately. In this instance, 2 tablets may be taken at the same time. The next day you should take 1 tablet twice a day as directed.  What may interact with this medicine?  Do not take this medicine with any of the following medications:  · defibrotide  This medicine may also interact with the following medications:  · aspirin and aspirin-like medicines  · certain antibiotics like erythromycin, azithromycin, and clarithromycin  · certain medicines for fungal infections like ketoconazole and itraconazole  · certain medicines for irregular heart beat like amiodarone, quinidine, dronedarone  · certain medicines for seizures like carbamazepine, phenytoin  · certain medicines that treat or prevent blood clots like warfarin, enoxaparin, and dalteparin  · conivaptan  · felodipine  · indinavir  · lopinavir; ritonavir  · NSAIDS, medicines for pain and inflammation, like ibuprofen or naproxen  · ranolazine  · rifampin  · ritonavir  · SNRIs, medicines for depression, like desvenlafaxine, duloxetine, levomilnacipran, venlafaxine  · SSRIs, medicines for depression, like citalopram, escitalopram, fluoxetine,  fluvoxamine, paroxetine, sertraline  · Joao's wort  · verapamil  This list may not describe all possible interactions. Give your health care provider a list of all the medicines, herbs, non-prescription drugs, or dietary supplements you use. Also tell them if you smoke, drink alcohol, or use illegal drugs. Some items may interact with your medicine.  What should I watch for while using this medicine?  Visit your healthcare professional for regular checks on your progress. You may need blood work done while you are taking this medicine. Your condition will be monitored carefully while you are receiving this medicine. It is important not to miss any appointments.  Avoid sports and activities that might cause injury while you are using this medicine. Severe falls or injuries can cause unseen bleeding. Be careful when using sharp tools or knives. Consider using an electric razor. Take special care brushing or flossing your teeth. Report any injuries, bruising, or red spots on the skin to your healthcare professional.  If you are going to need surgery or other procedure, tell your healthcare professional that you are taking this medicine.  Wear a medical ID bracelet or chain. Carry a card that describes your disease and details of your medicine and dosage times.  What side effects may I notice from receiving this medicine?  Side effects that you should report to your doctor or health care professional as soon as possible:  · allergic reactions like skin rash, itching or hives, swelling of the face, lips, or tongue  · back pain  · redness, blistering, peeling or loosening of the skin, including inside the mouth  · signs and symptoms of bleeding such as bloody or black, tarry stools; red or dark-brown urine; spitting up blood or brown material that looks like coffee grounds; red spots on the skin; unusual bruising or bleeding from the eye, gums, or nose  · signs and symptoms of a blood clot such as chest pain;  shortness of breath; pain, swelling, or warmth in the leg  · signs and symptoms of a stroke such as changes in vision; confusion; trouble speaking or understanding; severe headaches; sudden numbness or weakness of the face, arm or leg; trouble walking; dizziness; loss of coordination  Side effects that usually do not require medical attention (report to your doctor or health care professional if they continue or are bothersome):  · dizziness  · muscle pain  This list may not describe all possible side effects. Call your doctor for medical advice about side effects. You may report side effects to FDA at 1-390-AAE-3365.  Where should I keep my medicine?  Keep out of the reach of children.  Store at room temperature between 15 and 30 degrees C (59 and 86 degrees F). Throw away any unused medicine after the expiration date.  NOTE: This sheet is a summary. It may not cover all possible information. If you have questions about this medicine, talk to your doctor, pharmacist, or health care provider.  © 2020 [employer] (2020-03-16 09:45:59)        Discharge Instructions    Discharged to home by car with relative. Discharged via wheelchair, hospital escort: Yes.  Special equipment needed: Not Applicable    Be sure to schedule a follow-up appointment with your primary care doctor or any specialists as instructed.     Discharge Plan:   Diet Plan: Discussed  Activity Level: Discussed  Confirmed Follow up Appointment: Patient to Call and Schedule Appointment  Confirmed Symptoms Management: Discussed  Medication Reconciliation Updated: Yes  Influenza Vaccine Indication: Not indicated: Previously immunized this influenza season and > 8 years of age    I understand that a diet low in cholesterol, fat, and sodium is recommended for good health. Unless I have been given specific instructions below for another diet, I accept this instruction as my diet prescription.   Other diet: regular    Special Instructions:  None    · Is patient discharged on Warfarin / Coumadin?   No     Depression / Suicide Risk    As you are discharged from this [facility] facility, it is important to learn how to keep safe from harming yourself.    Recognize the warning signs:  · Abrupt changes in personality, positive or negative- including increase in energy   · Giving away possessions  · Change in eating patterns- significant weight changes-  positive or negative  · Change in sleeping patterns- unable to sleep or sleeping all the time   · Unwillingness or inability to communicate  · Depression  · Unusual sadness, discouragement and loneliness  · Talk of wanting to die  · Neglect of personal appearance   · Rebelliousness- reckless behavior  · Withdrawal from people/activities they love  · Confusion- inability to concentrate     If you or a loved one observes any of these behaviors or has concerns about self-harm, here's what you can do:  · Talk about it- your feelings and reasons for harming yourself  · Remove any means that you might use to hurt yourself (examples: pills, rope, extension cords, firearm)  · Get professional help from the community (Mental Health, Substance Abuse, psychological counseling)  · Do not be alone:Call your Safe Contact- someone whom you trust who will be there for you.  · Call your local CRISIS HOTLINE 190-0453 or 791-625-6072  · Call your local Children's Mobile Crisis Response Team [place] (802) 332-9297 or www.[employer]  · Call the toll free National Suicide Prevention Hotlines   · National Suicide Prevention Lifeline 066-668-PSZP (6399)  · National Hope Line Network 800-SUICIDE (884-5259)

## 2021-01-05 ENCOUNTER — TELEPHONE (OUTPATIENT)
Dept: VASCULAR LAB | Facility: MEDICAL CENTER | Age: 61
End: 2021-01-05

## 2021-01-05 ENCOUNTER — OFFICE VISIT (OUTPATIENT)
Dept: MEDICAL GROUP | Facility: CLINIC | Age: 61
End: 2021-01-05
Payer: COMMERCIAL

## 2021-01-05 VITALS
SYSTOLIC BLOOD PRESSURE: 162 MMHG | HEIGHT: 68 IN | BODY MASS INDEX: 30.46 KG/M2 | DIASTOLIC BLOOD PRESSURE: 94 MMHG | WEIGHT: 201 LBS | RESPIRATION RATE: 16 BRPM | TEMPERATURE: 98.5 F | OXYGEN SATURATION: 95 % | HEART RATE: 82 BPM

## 2021-01-05 DIAGNOSIS — E11.65 TYPE 2 DIABETES MELLITUS WITH HYPERGLYCEMIA, WITHOUT LONG-TERM CURRENT USE OF INSULIN (HCC): ICD-10-CM

## 2021-01-05 DIAGNOSIS — S22.43XA CLOSED FRACTURE OF MULTIPLE RIBS OF BOTH SIDES, INITIAL ENCOUNTER: ICD-10-CM

## 2021-01-05 DIAGNOSIS — I26.90 ACUTE SEPTIC PULMONARY EMBOLISM WITHOUT ACUTE COR PULMONALE (HCC): ICD-10-CM

## 2021-01-05 DIAGNOSIS — I10 ESSENTIAL HYPERTENSION: ICD-10-CM

## 2021-01-05 DIAGNOSIS — R73.01 ELEVATED FASTING GLUCOSE: ICD-10-CM

## 2021-01-05 DIAGNOSIS — E78.49 OTHER HYPERLIPIDEMIA: ICD-10-CM

## 2021-01-05 PROBLEM — Z11.9 SCREENING EXAMINATION FOR INFECTIOUS DISEASE: Status: RESOLVED | Noted: 2020-12-31 | Resolved: 2021-01-05

## 2021-01-05 PROBLEM — Z78.9 NO CONTRAINDICATION TO ANTICOAGULATION THERAPY: Status: RESOLVED | Noted: 2020-12-31 | Resolved: 2021-01-05

## 2021-01-05 LAB
HBA1C MFR BLD: 7.5 % (ref 0–5.6)
INT CON NEG: NEGATIVE
INT CON POS: POSITIVE

## 2021-01-05 PROCEDURE — 83036 HEMOGLOBIN GLYCOSYLATED A1C: CPT | Performed by: PHYSICIAN ASSISTANT

## 2021-01-05 PROCEDURE — 99214 OFFICE O/P EST MOD 30 MIN: CPT | Performed by: PHYSICIAN ASSISTANT

## 2021-01-05 RX ORDER — LISINOPRIL 10 MG/1
10 TABLET ORAL DAILY
Qty: 30 TAB | Refills: 3 | Status: SHIPPED | OUTPATIENT
Start: 2021-01-05 | End: 2021-04-07 | Stop reason: SDUPTHER

## 2021-01-05 RX ORDER — ATORVASTATIN CALCIUM 10 MG/1
10 TABLET, FILM COATED ORAL DAILY
Qty: 30 TAB | Refills: 3 | Status: SHIPPED | OUTPATIENT
Start: 2021-01-05 | End: 2021-04-07 | Stop reason: SDUPTHER

## 2021-01-05 ASSESSMENT — FIBROSIS 4 INDEX: FIB4 SCORE: 1.3

## 2021-01-05 NOTE — TELEPHONE ENCOUNTER
[facility]      Called and spoke to the patient to establish care regarding anticoagulation referral for Xarelto due to Hx of PE  from [place] [facility]  on 1/2/21     Patient made NP appt to be seen 1/11/21.     Insurance:   PCP: non-[facility]   Locations to be seen: MAIN     Phone number left for return call or any questions or concerns.  [facility] at [phone], fax [phone]      Carmel TrinidadD
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
## 2021-01-11 ENCOUNTER — TELEPHONE (OUTPATIENT)
Dept: VASCULAR LAB | Facility: MEDICAL CENTER | Age: 61
End: 2021-01-11

## 2021-01-11 ENCOUNTER — ANTICOAGULATION VISIT (OUTPATIENT)
Dept: VASCULAR LAB | Facility: MEDICAL CENTER | Age: 61
End: 2021-01-11
Attending: INTERNAL MEDICINE
Payer: COMMERCIAL

## 2021-01-11 VITALS — HEART RATE: 96 BPM | SYSTOLIC BLOOD PRESSURE: 154 MMHG | DIASTOLIC BLOOD PRESSURE: 106 MMHG

## 2021-01-11 DIAGNOSIS — I26.90 ACUTE SEPTIC PULMONARY EMBOLISM WITHOUT ACUTE COR PULMONALE (HCC): ICD-10-CM

## 2021-01-11 LAB
INR BLD: 1.3 (ref 0.9–1.2)
INR PPP: 1.3 (ref 2–3.5)

## 2021-01-11 PROCEDURE — 85610 PROTHROMBIN TIME: CPT

## 2021-01-11 PROCEDURE — 99213 OFFICE O/P EST LOW 20 MIN: CPT | Performed by: NURSE PRACTITIONER

## 2021-01-11 NOTE — PATIENT INSTRUCTIONS
- continue taking Xarelto 15 mg by mouth twice daily with food  - go to the ER for shortness of breath, chest pain, pain with deep inhalation, worsening leg swelling and/or pain in calf or leg   - avoid sedentary periods  - let all your providers know you take this medication  - don't stop this medication without permission from your doctor or our clinic  -  refills before you run out  - continue complete avoidance of tobacco products  - to avoid Aspirin and anti-inflammatories (eg. Advil, ibuprofen, Aleve, naproxen, etc) while anticoagulated   - Avoid skiing or other dangerous activities to reduce risk of head injury and brain bleeds  - elevate legs as much as possible, use compression stockings/socks if directed by your provider  - if any bleeding lasting 30min without stopping, please seek care with your PCP, urgent care, or ED  - if having any invasive procedure, please make sure the doctor knows of your history of blood clots and current anticoagulation status  - let us know of any medication changes
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
## 2021-01-13 ENCOUNTER — OFFICE VISIT (OUTPATIENT)
Dept: HEMATOLOGY ONCOLOGY | Facility: MEDICAL CENTER | Age: 61
End: 2021-01-13
Payer: COMMERCIAL

## 2021-01-13 VITALS
WEIGHT: 193.56 LBS | TEMPERATURE: 98.5 F | HEIGHT: 68 IN | SYSTOLIC BLOOD PRESSURE: 124 MMHG | RESPIRATION RATE: 14 BRPM | BODY MASS INDEX: 29.34 KG/M2 | OXYGEN SATURATION: 95 % | DIASTOLIC BLOOD PRESSURE: 82 MMHG | HEART RATE: 76 BPM

## 2021-01-13 DIAGNOSIS — I26.99 PULMONARY EMBOLISM ON LEFT (HCC): ICD-10-CM

## 2021-01-13 PROCEDURE — 99204 OFFICE O/P NEW MOD 45 MIN: CPT | Performed by: INTERNAL MEDICINE

## 2021-01-13 ASSESSMENT — FIBROSIS 4 INDEX: FIB4 SCORE: 1.3

## 2021-01-13 ASSESSMENT — PATIENT HEALTH QUESTIONNAIRE - PHQ9: CLINICAL INTERPRETATION OF PHQ2 SCORE: 0

## 2021-01-13 ASSESSMENT — PAIN SCALES - GENERAL: PAINLEVEL: NO PAIN
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
## 2021-01-22 ENCOUNTER — ANTICOAGULATION VISIT (OUTPATIENT)
Dept: VASCULAR LAB | Facility: MEDICAL CENTER | Age: 61
End: 2021-01-22
Attending: INTERNAL MEDICINE
Payer: COMMERCIAL

## 2021-01-22 DIAGNOSIS — I26.90 ACUTE SEPTIC PULMONARY EMBOLISM WITHOUT ACUTE COR PULMONALE (HCC): ICD-10-CM

## 2021-01-22 PROCEDURE — 99212 OFFICE O/P EST SF 10 MIN: CPT
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
## 2021-02-02 ENCOUNTER — OFFICE VISIT (OUTPATIENT)
Dept: MEDICAL GROUP | Facility: CLINIC | Age: 61
End: 2021-02-02
Payer: COMMERCIAL

## 2021-02-02 VITALS
RESPIRATION RATE: 18 BRPM | SYSTOLIC BLOOD PRESSURE: 122 MMHG | HEIGHT: 68 IN | TEMPERATURE: 97.5 F | WEIGHT: 189 LBS | HEART RATE: 70 BPM | DIASTOLIC BLOOD PRESSURE: 70 MMHG | OXYGEN SATURATION: 97 % | BODY MASS INDEX: 28.64 KG/M2

## 2021-02-02 DIAGNOSIS — S81.001D OPEN WOUND OF RIGHT KNEE, SUBSEQUENT ENCOUNTER: ICD-10-CM

## 2021-02-02 DIAGNOSIS — R16.1 SPLENOMEGALY: ICD-10-CM

## 2021-02-02 DIAGNOSIS — I26.90 ACUTE SEPTIC PULMONARY EMBOLISM WITHOUT ACUTE COR PULMONALE (HCC): ICD-10-CM

## 2021-02-02 DIAGNOSIS — S22.43XA CLOSED FRACTURE OF MULTIPLE RIBS OF BOTH SIDES, INITIAL ENCOUNTER: ICD-10-CM

## 2021-02-02 DIAGNOSIS — E11.65 TYPE 2 DIABETES MELLITUS WITH HYPERGLYCEMIA, WITHOUT LONG-TERM CURRENT USE OF INSULIN (HCC): ICD-10-CM

## 2021-02-02 PROBLEM — S81.001A OPEN WOUND OF RIGHT KNEE: Status: ACTIVE | Noted: 2021-02-02

## 2021-02-02 PROCEDURE — 99213 OFFICE O/P EST LOW 20 MIN: CPT | Performed by: PHYSICIAN ASSISTANT

## 2021-02-02 ASSESSMENT — FIBROSIS 4 INDEX: FIB4 SCORE: 1.3
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
## 2021-02-11 ENCOUNTER — HOSPITAL ENCOUNTER (OUTPATIENT)
Dept: RADIOLOGY | Facility: MEDICAL CENTER | Age: 61
End: 2021-02-11
Attending: PHYSICIAN ASSISTANT
Payer: COMMERCIAL

## 2021-02-11 DIAGNOSIS — R16.1 SPLENOMEGALY: ICD-10-CM

## 2021-02-11 PROCEDURE — 76700 US EXAM ABDOM COMPLETE: CPT

## 2021-02-22 ENCOUNTER — PATIENT MESSAGE (OUTPATIENT)
Dept: HEMATOLOGY ONCOLOGY | Facility: MEDICAL CENTER | Age: 61
End: 2021-02-22

## 2021-02-22 DIAGNOSIS — I26.99 ACUTE PULMONARY EMBOLISM WITHOUT ACUTE COR PULMONALE, UNSPECIFIED PULMONARY EMBOLISM TYPE (HCC): ICD-10-CM

## 2021-02-22 DIAGNOSIS — S22.43XA MULTIPLE FRACTURES OF RIBS, BILATERAL, INITIAL ENCOUNTER FOR CLOSED FRACTURE: ICD-10-CM

## 2021-03-05 ENCOUNTER — HOSPITAL ENCOUNTER (OUTPATIENT)
Dept: LAB | Facility: MEDICAL CENTER | Age: 61
End: 2021-03-05
Attending: PHYSICIAN ASSISTANT
Payer: COMMERCIAL

## 2021-03-05 ENCOUNTER — ANTICOAGULATION VISIT (OUTPATIENT)
Dept: VASCULAR LAB | Facility: MEDICAL CENTER | Age: 61
End: 2021-03-05
Attending: INTERNAL MEDICINE
Payer: COMMERCIAL

## 2021-03-05 VITALS — SYSTOLIC BLOOD PRESSURE: 134 MMHG | HEART RATE: 76 BPM | DIASTOLIC BLOOD PRESSURE: 74 MMHG

## 2021-03-05 DIAGNOSIS — I26.90 ACUTE SEPTIC PULMONARY EMBOLISM WITHOUT ACUTE COR PULMONALE (HCC): Primary | ICD-10-CM

## 2021-03-05 DIAGNOSIS — I26.90 ACUTE SEPTIC PULMONARY EMBOLISM WITHOUT ACUTE COR PULMONALE (HCC): ICD-10-CM

## 2021-03-05 LAB
BASOPHILS # BLD AUTO: 0.5 % (ref 0–1.8)
BASOPHILS # BLD: 0.05 K/UL (ref 0–0.12)
CRP SERPL HS-MCNC: 6.4 MG/L (ref 0–7.5)
EOSINOPHIL # BLD AUTO: 0.18 K/UL (ref 0–0.51)
EOSINOPHIL NFR BLD: 1.9 % (ref 0–6.9)
ERYTHROCYTE [DISTWIDTH] IN BLOOD BY AUTOMATED COUNT: 44.5 FL (ref 35.9–50)
HCT VFR BLD AUTO: 47.3 % (ref 42–52)
HGB BLD-MCNC: 15.5 G/DL (ref 14–18)
IMM GRANULOCYTES # BLD AUTO: 0.03 K/UL (ref 0–0.11)
IMM GRANULOCYTES NFR BLD AUTO: 0.3 % (ref 0–0.9)
LYMPHOCYTES # BLD AUTO: 1.03 K/UL (ref 1–4.8)
LYMPHOCYTES NFR BLD: 10.8 % (ref 22–41)
MCH RBC QN AUTO: 30.7 PG (ref 27–33)
MCHC RBC AUTO-ENTMCNC: 32.8 G/DL (ref 33.7–35.3)
MCV RBC AUTO: 93.7 FL (ref 81.4–97.8)
MONOCYTES # BLD AUTO: 0.58 K/UL (ref 0–0.85)
MONOCYTES NFR BLD AUTO: 6.1 % (ref 0–13.4)
NEUTROPHILS # BLD AUTO: 7.63 K/UL (ref 1.82–7.42)
NEUTROPHILS NFR BLD: 80.4 % (ref 44–72)
NRBC # BLD AUTO: 0 K/UL
NRBC BLD-RTO: 0 /100 WBC
PLATELET # BLD AUTO: 296 K/UL (ref 164–446)
PMV BLD AUTO: 11.5 FL (ref 9–12.9)
RBC # BLD AUTO: 5.05 M/UL (ref 4.7–6.1)
WBC # BLD AUTO: 9.5 K/UL (ref 4.8–10.8)

## 2021-03-05 PROCEDURE — 85025 COMPLETE CBC W/AUTO DIFF WBC: CPT

## 2021-03-05 PROCEDURE — 85305 CLOT INHIBIT PROT S TOTAL: CPT

## 2021-03-05 PROCEDURE — 36415 COLL VENOUS BLD VENIPUNCTURE: CPT

## 2021-03-05 PROCEDURE — 99212 OFFICE O/P EST SF 10 MIN: CPT

## 2021-03-05 PROCEDURE — 86141 C-REACTIVE PROTEIN HS: CPT

## 2021-03-05 PROCEDURE — 81241 F5 GENE: CPT

## 2021-03-05 PROCEDURE — 85306 CLOT INHIBIT PROT S FREE: CPT
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
## 2021-03-09 LAB — PROT S FREE AG ACT/NOR PPP IA: 62 % (ref 74–147)

## 2021-03-10 LAB — PROT S AG ACT/NOR PPP IA: 120 % (ref 84–134)

## 2021-03-11 LAB — F5 P.R506Q BLD/T QL: NEGATIVE

## 2021-03-19 ENCOUNTER — PATIENT MESSAGE (OUTPATIENT)
Dept: MEDICAL GROUP | Facility: CLINIC | Age: 61
End: 2021-03-19

## 2021-03-19 DIAGNOSIS — S22.43XA MULTIPLE FRACTURES OF RIBS, BILATERAL, INITIAL ENCOUNTER FOR CLOSED FRACTURE: ICD-10-CM

## 2021-03-19 DIAGNOSIS — I26.99 ACUTE PULMONARY EMBOLISM WITHOUT ACUTE COR PULMONALE, UNSPECIFIED PULMONARY EMBOLISM TYPE (HCC): ICD-10-CM

## 2021-03-19 NOTE — TELEPHONE ENCOUNTER
From: Ryan Nunez  To: Physician Assistant Huma Chaudhari  Sent: 3/19/2021 1:47 PM PDT  Subject: Prescription Question    This message is being sent by Susan Nunez on behalf of Ryan Nunez.    Ryan will run out of his blood thinners and needs a refill. He has no refills on the prescription. Would you please submit a refill for this?

## 2021-03-19 NOTE — PATIENT COMMUNICATION
Was the patient seen in the last year in this department? Yes    Does patient have an active prescription for medications requested? Yes    Received Request Via: Patient    Hospital Outpatient Visit on 03/05/2021   Component Date Value   • WBC 03/05/2021 9.5    • RBC 03/05/2021 5.05    • Hemoglobin 03/05/2021 15.5    • Hematocrit 03/05/2021 47.3    • MCV 03/05/2021 93.7    • MCH 03/05/2021 30.7    • MCHC 03/05/2021 32.8*   • RDW 03/05/2021 44.5    • Platelet Count 03/05/2021 296    • MPV 03/05/2021 11.5    • Neutrophils-Polys 03/05/2021 80.40*   • Lymphocytes 03/05/2021 10.80*   • Monocytes 03/05/2021 6.10    • Eosinophils 03/05/2021 1.90    • Basophils 03/05/2021 0.50    • Immature Granulocytes 03/05/2021 0.30    • Nucleated RBC 03/05/2021 0.00    • Neutrophils (Absolute) 03/05/2021 7.63*   • Lymphs (Absolute) 03/05/2021 1.03    • Monos (Absolute) 03/05/2021 0.58    • Eos (Absolute) 03/05/2021 0.18    • Baso (Absolute) 03/05/2021 0.05    • Immature Granulocytes (a* 03/05/2021 0.03    • NRBC (Absolute) 03/05/2021 0.00    • C Reactive Protein High * 03/05/2021 6.4    • V Leiden Factor 03/05/2021 Negative    • Protein S, Free Ag 03/05/2021 62*   • Protein S, Total Antigen 03/05/2021 120    Anticoagulation Visit on 01/11/2021   Component Date Value   • INR 01/11/2021 1.30    • POC INR 01/11/2021 1.3*   Office Visit on 01/05/2021   Component Date Value   • Glycohemoglobin 01/05/2021 7.5*   • Internal Control Negative 01/05/2021 Negative    • Internal Control Positive 01/05/2021 Positive    Admission on 12/31/2020, Discharged on 01/02/2021   Component Date Value   • WBC 12/31/2020 15.8*   • RBC 12/31/2020 5.36    • Hemoglobin 12/31/2020 17.0    • Hematocrit 12/31/2020 49.3    • MCV 12/31/2020 92.0    • MCH 12/31/2020 31.7    • MCHC 12/31/2020 34.5    • RDW 12/31/2020 43.3    • Platelet Count 12/31/2020 388    • MPV 12/31/2020 10.5    • Neutrophils-Polys 12/31/2020 82.20*   • Lymphocytes 12/31/2020 9.60*   • Monocytes  2020 7.10    • Eosinophils 2020 0.10    • Basophils 2020 0.40    • Immature Granulocytes 2020 0.60    • Nucleated RBC 2020 0.00    • Neutrophils (Absolute) 2020 12.97*   • Lymphs (Absolute) 2020 1.52    • Monos (Absolute) 2020 1.12*   • Eos (Absolute) 2020 0.02    • Baso (Absolute) 2020 0.06    • Immature Granulocytes (a* 2020 0.10    • NRBC (Absolute) 2020 0.00    • Sodium 2020 136    • Potassium 2020 3.9    • Chloride 2020 99    • Co2 2020 25    • Anion Gap 2020 12.0    • Glucose 2020 244*   • Bun 2020 14    • Creatinine 2020 1.11    • Calcium 2020 9.4    • AST(SGOT) 2020 39    • ALT(SGPT) 2020 58*   • Alkaline Phosphatase 2020 99    • Total Bilirubin 2020 1.0    • Albumin 2020 4.4    • Total Protein 2020 7.5    • Globulin 2020 3.1    • A-G Ratio 2020 1.4    • Lipase 2020 16    • Diagnostic Alcohol 2020 24.2*   • ABO Grouping Only 2020 A    • Rh Grouping Only 2020 POS    • Antibody Screen-Cod 2020 NEG    • GFR If  2020 >60    • GFR If Non  Ameri* 2020 >60    • ABO Rh Confirm 2021 A POS    • Troponin T 2020 20*   • APTT 2020 31.2    • PT 2020 13.8    • INR 2020 1.03    • Report 2020                      Value:[facility]    Test Date:  2020  Pt Name:    CLEMENTE UMAÑA        Department: ER  MRN:        [id number]                      Room:        16  Gender:     Male                         Technician: LEISA  :        [id number]                   Requested By:SARAH FRANCO  Order #:    [id number]                    Reading MD:    Measurements  Intervals                                Axis  Rate:       67                           P:          48  UT:         192                          QRS:         28  QRSD:       96                           T:          55  QT:         412  QTc:        435    Interpretive Statements  SINUS RHYTHM  PROBABLE LEFT ATRIAL ABNORMALITY  No previous ECG available for comparison     • COVID Order Status 12/31/2020 Received    • Influenza virus A RNA 12/31/2020 Negative    • Influenza virus B, PCR 12/31/2020 Negative    • RSV, PCR 12/31/2020 Negative    • SARS-CoV-2 by PCR 12/31/2020 NotDetected    • SARS-CoV-2 Source 12/31/2020 NP Swab    • WBC 01/01/2021 8.3    • RBC 01/01/2021 4.64*   • Hemoglobin 01/01/2021 14.7    • Hematocrit 01/01/2021 43.6    • MCV 01/01/2021 94.0    • MCH 01/01/2021 31.7    • MCHC 01/01/2021 33.7    • RDW 01/01/2021 43.9    • Platelet Count 01/01/2021 241    • MPV 01/01/2021 10.7    • Neutrophils-Polys 01/01/2021 64.90    • Lymphocytes 01/01/2021 23.60    • Monocytes 01/01/2021 8.60    • Eosinophils 01/01/2021 1.90    • Basophils 01/01/2021 0.60    • Immature Granulocytes 01/01/2021 0.40    • Nucleated RBC 01/01/2021 0.00    • Neutrophils (Absolute) 01/01/2021 5.40    • Lymphs (Absolute) 01/01/2021 1.97    • Monos (Absolute) 01/01/2021 0.72    • Eos (Absolute) 01/01/2021 0.16    • Baso (Absolute) 01/01/2021 0.05    • Immature Granulocytes (a* 01/01/2021 0.03    • NRBC (Absolute) 01/01/2021 0.00    • Sodium 01/01/2021 138    • Potassium 01/01/2021 4.2    • Chloride 01/01/2021 103    • Co2 01/01/2021 28    • Anion Gap 01/01/2021 7.0    • Glucose 01/01/2021 143*   • Bun 01/01/2021 12    • Creatinine 01/01/2021 1.06    • Calcium 01/01/2021 8.5    • AST(SGOT) 01/01/2021 34    • ALT(SGPT) 01/01/2021 44    • Alkaline Phosphatase 01/01/2021 85    • Total Bilirubin 01/01/2021 0.9    • Albumin 01/01/2021 3.5    • Total Protein 01/01/2021 5.8*   • Globulin 01/01/2021 2.3    • A-G Ratio 01/01/2021 1.5    • GFR If  01/01/2021 >60    • GFR If Non  Ameri* 01/01/2021 >60    ]

## 2021-03-25 ENCOUNTER — TELEMEDICINE (OUTPATIENT)
Dept: MEDICAL GROUP | Facility: CLINIC | Age: 61
End: 2021-03-25
Payer: COMMERCIAL

## 2021-03-25 DIAGNOSIS — D72.9 ABNORMAL WBC COUNT: ICD-10-CM

## 2021-03-25 DIAGNOSIS — I26.90 ACUTE SEPTIC PULMONARY EMBOLISM WITHOUT ACUTE COR PULMONALE (HCC): ICD-10-CM

## 2021-03-25 DIAGNOSIS — E11.65 TYPE 2 DIABETES MELLITUS WITH HYPERGLYCEMIA, WITHOUT LONG-TERM CURRENT USE OF INSULIN (HCC): ICD-10-CM

## 2021-03-25 DIAGNOSIS — E78.5 DYSLIPIDEMIA: ICD-10-CM

## 2021-03-25 PROCEDURE — 99213 OFFICE O/P EST LOW 20 MIN: CPT | Mod: 95,CR | Performed by: PHYSICIAN ASSISTANT
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
## 2021-04-07 ENCOUNTER — HOSPITAL ENCOUNTER (OUTPATIENT)
Dept: LAB | Facility: MEDICAL CENTER | Age: 61
End: 2021-04-07
Attending: INTERNAL MEDICINE
Payer: COMMERCIAL

## 2021-04-07 ENCOUNTER — HOSPITAL ENCOUNTER (OUTPATIENT)
Dept: LAB | Facility: MEDICAL CENTER | Age: 61
End: 2021-04-07
Attending: NURSE PRACTITIONER
Payer: COMMERCIAL

## 2021-04-07 ENCOUNTER — OFFICE VISIT (OUTPATIENT)
Dept: MEDICAL GROUP | Facility: PHYSICIAN GROUP | Age: 61
End: 2021-04-07
Payer: COMMERCIAL

## 2021-04-07 VITALS
TEMPERATURE: 97.8 F | SYSTOLIC BLOOD PRESSURE: 124 MMHG | OXYGEN SATURATION: 99 % | RESPIRATION RATE: 14 BRPM | BODY MASS INDEX: 30.37 KG/M2 | DIASTOLIC BLOOD PRESSURE: 88 MMHG | WEIGHT: 189 LBS | HEIGHT: 66 IN | HEART RATE: 62 BPM

## 2021-04-07 DIAGNOSIS — E11.65 TYPE 2 DIABETES MELLITUS WITH HYPERGLYCEMIA, WITHOUT LONG-TERM CURRENT USE OF INSULIN (HCC): ICD-10-CM

## 2021-04-07 DIAGNOSIS — E78.5 DYSLIPIDEMIA: ICD-10-CM

## 2021-04-07 DIAGNOSIS — Z13.29 SCREENING FOR THYROID DISORDER: ICD-10-CM

## 2021-04-07 DIAGNOSIS — Z11.59 NEED FOR HEPATITIS C SCREENING TEST: ICD-10-CM

## 2021-04-07 DIAGNOSIS — Z12.11 SCREENING FOR COLON CANCER: ICD-10-CM

## 2021-04-07 DIAGNOSIS — I10 ESSENTIAL HYPERTENSION: ICD-10-CM

## 2021-04-07 DIAGNOSIS — I26.90 ACUTE SEPTIC PULMONARY EMBOLISM WITHOUT ACUTE COR PULMONALE (HCC): ICD-10-CM

## 2021-04-07 LAB
HBA1C MFR BLD: 5.9 % (ref 0–5.6)
INT CON NEG: ABNORMAL
INT CON POS: ABNORMAL

## 2021-04-07 PROCEDURE — 85025 COMPLETE CBC W/AUTO DIFF WBC: CPT

## 2021-04-07 PROCEDURE — 82570 ASSAY OF URINE CREATININE: CPT

## 2021-04-07 PROCEDURE — 84443 ASSAY THYROID STIM HORMONE: CPT

## 2021-04-07 PROCEDURE — 80061 LIPID PANEL: CPT

## 2021-04-07 PROCEDURE — 80053 COMPREHEN METABOLIC PANEL: CPT

## 2021-04-07 PROCEDURE — 99214 OFFICE O/P EST MOD 30 MIN: CPT | Performed by: NURSE PRACTITIONER

## 2021-04-07 PROCEDURE — G0472 HEP C SCREEN HIGH RISK/OTHER: HCPCS

## 2021-04-07 PROCEDURE — 83036 HEMOGLOBIN GLYCOSYLATED A1C: CPT | Performed by: NURSE PRACTITIONER

## 2021-04-07 PROCEDURE — 82043 UR ALBUMIN QUANTITATIVE: CPT

## 2021-04-07 PROCEDURE — 36415 COLL VENOUS BLD VENIPUNCTURE: CPT

## 2021-04-07 RX ORDER — ATORVASTATIN CALCIUM 10 MG/1
10 TABLET, FILM COATED ORAL DAILY
Qty: 30 TABLET | Refills: 3 | Status: SHIPPED | OUTPATIENT
Start: 2021-04-07 | End: 2021-09-20 | Stop reason: SDUPTHER

## 2021-04-07 RX ORDER — LISINOPRIL 10 MG/1
10 TABLET ORAL DAILY
Qty: 30 TABLET | Refills: 3 | Status: SHIPPED | OUTPATIENT
Start: 2021-04-07 | End: 2021-09-20 | Stop reason: SDUPTHER

## 2021-04-07 ASSESSMENT — FIBROSIS 4 INDEX: FIB4 SCORE: 1.06

## 2021-04-07 ASSESSMENT — PATIENT HEALTH QUESTIONNAIRE - PHQ9: CLINICAL INTERPRETATION OF PHQ2 SCORE: 0

## 2021-04-07 NOTE — PROGRESS NOTES
Chief Complaint   Patient presents with   • Establish Care   • Diabetes       HISTORY OF PRESENT ILLNESS: Patient is a 60 y.o. male established patient who presents today to establish care, discuss acute chronic conditions, labs    Dyslipidemia  The 10-year ASCVD risk score (Krystal QUIRZO Jr., et al., 2013) is: 18.2%  Patient appropriately on statin, takes atorvastatin 10 mg daily  Due for labs, these have been ordered  Discussed the importance of ongoing healthy lifestyle modifications    Essential hypertension  This is a chronic and stable condition  Adequately controlled on lisinopril 10 mg daily blood pressure today well within normal limits at 124/88  He has expressed to me that he would eventually like to get off of most of his medications and take a more holistic approach  We did discuss coming off medications and the possibility of this along with healthy lifestyle modifications including healthy diet, regular exercise and efforts towards weight loss  For right now he agrees to stay on the medications      Pulmonary embolism (HCC)  Patient did suffer from pulmonary embolism back in January  Although DVT study was negative  He is followed by anticoagulation clinic and is on Xarelto  He is due to come off this medication very soon  Denies symptoms associated with this    Type 2 diabetes mellitus with hyperglycemia, without long-term current use of insulin (HCC)  This is a chronic condition, under excellent control, takes Metformin 500 mg twice a day  A1c in office today 5.9  He does understand this is in a prediabetes range and expresses to me that he would eventually like to get off the medication  In addition to healthy lifestyle modifications we will continue on the Metformin but at only once a day  He will follow up in 3 to 4 months with labs done before visit  Appropriately on statin and ACE inhibitor      Patient Active Problem List    Diagnosis Date Noted   • Pulmonary embolism (HCC) 12/31/2020   • Closed  fracture of multiple ribs of both sides 12/31/2020   • Bladder wall thickening 12/31/2020   • GERD (gastroesophageal reflux disease) 12/31/2020   • Trauma 12/31/2020   • Abnormal WBC count 03/25/2021   • Splenomegaly 02/02/2021   • Open wound of right knee 02/02/2021   • Type 2 diabetes mellitus with hyperglycemia, without long-term current use of insulin (HCC) 01/05/2021   • Other hyperlipidemia 01/05/2021   • Essential hypertension 01/05/2021   • Dyslipidemia 02/21/2012   • Elevated fasting glucose 01/20/2012   • ASTHMA 01/20/2012       Allergies:Patient has no known allergies.    Current Outpatient Medications   Medication Sig Dispense Refill   • atorvastatin (LIPITOR) 10 MG Tab Take 1 tablet by mouth every day. 30 tablet 3   • lisinopril (PRINIVIL) 10 MG Tab Take 1 tablet by mouth every day. 30 tablet 3   • metFORMIN (GLUCOPHAGE) 500 MG Tab Take 1 tablet by mouth every day. 30 tablet 3   • rivaroxaban (XARELTO) 20 MG Tab tablet Take 1 Tab by mouth with dinner. 30 tablet 0   • Cholecalciferol (VITAMIN D3 PO) Take 1 Tab by mouth every morning.     • Multiple Vitamins-Minerals (MULTIVITAMIN ADULT PO) Take 1 Tab by mouth every morning.     • omeprazole (PRILOSEC OTC) 20 MG tablet Take 20 mg by mouth every day.       No current facility-administered medications for this visit.       Social History     Tobacco Use   • Smoking status: Never Smoker   • Smokeless tobacco: Never Used   Substance Use Topics   • Alcohol use: Yes     Comment: rarely   • Drug use: Never       Family Status   Relation Name Status   • Fa  (Not Specified)     Family History   Problem Relation Age of Onset   • Lung Disease Father        Review of Systems:   Constitutional: Negative for fever, chills, weight loss and malaise/fatigue.    Respiratory: Negative for cough, sputum production, shortness of breath and wheezing.    Cardiovascular: Negative for chest pain, palpitations, orthopnea and leg swelling.   Gastrointestinal: Negative for  "heartburn, nausea, vomiting and abdominal pain.   Genitourinary/Renal: Negative for dysuria, urgency and frequency.   Musculoskeletal: Negative for myalgias, back pain and joint pain.   Skin: Negative for rash and itching.   Neurological: Negative for dizziness, tingling, tremors, sensory change, focal weakness and headaches.   Endo/Heme/Allergies: Positive per HPI    Exam:  /88   Pulse 62   Temp 36.6 °C (97.8 °F) (Temporal)   Resp 14   Ht 1.676 m (5' 6\")   Wt 85.7 kg (189 lb)   SpO2 99%   General:  Well nourished, well developed male in NAD  Skin: warm, dry, intact, no evidence of rash or concerning lesions  Head: is grossly normal.  HEENT: eyes clear, conjunctiva normal, PERRLA  Pulmonary: Clear to ausculation. Normal effort. No rales, ronchi, or wheezing.  Cardiovascular: Regular rate and rhythm without murmur. Carotid and radial pulses are intact and equal bilaterally.  Abdomen: soft, non-tender, positive bowel sounds  Musculoskeletal: no clubbing, cyanosis, or edema.  Psych/mental: no depression, anxiety, hallucinations  Neuro: alert, intact, CN 2-12 grossly intact  Diabetic Foot Exam: No ulcers, erythema or skin lesions present, patient tested with monofilament (10g) and tuning fork found to be sensitive bilaterally throughout the ball of the foot, great toe and heel.      Medical decision-making and discussion:    As mentioned above, patient will have labs done today, he will be notified of results and further actions if needed.  He will decrease Metformin down to 500 mg once a day and reevaluate labs in 3 to 4 months.  Agrees to referral to gastroenterology for screening colonoscopy.  Advised continue care per specialist including the anticoagulation clinic/vascular medicine        Assessment/Plan:  Ryan Rose was seen today for establish care and diabetes.    Diagnoses and all orders for this visit:    Type 2 diabetes mellitus with hyperglycemia, without long-term current use of insulin " (HCC)  -     Diabetic Monofilament LE Exam  -     Microalbumin Creat Ratio Urine (Clinic Collect); Future  -     Lipid Profile; Future  -     POCT Hemoglobin A1C  -     metFORMIN (GLUCOPHAGE) 500 MG Tab; Take 1 tablet by mouth every day.    Essential hypertension  -     lisinopril (PRINIVIL) 10 MG Tab; Take 1 tablet by mouth every day.    Dyslipidemia  -     CBC WITH DIFFERENTIAL; Future  -     Lipid Profile; Future  -     atorvastatin (LIPITOR) 10 MG Tab; Take 1 tablet by mouth every day.    Acute septic pulmonary embolism without acute cor pulmonale (HCC)    Screening for thyroid disorder  -     TSH WITH REFLEX TO FT4; Future    Screening for colon cancer  -     REFERRAL TO GI FOR COLONOSCOPY    Need for hepatitis C screening test  -     HCV Scrn ( [id number] 1xLife); Future    Other orders  -     Obtain Results: Retinal screening; Obtain Results From: Other (see comment)         Return in about 4 months (around 2021) for Follow-up, Discuss Labs.    Please note that this dictation was created using voice recognition software. I have made every reasonable attempt to correct obvious errors, but I expect that there are errors of grammar and possibly content that I did not discover before finalizing the note.

## 2021-04-08 ENCOUNTER — NON-PROVIDER VISIT (OUTPATIENT)
Dept: HEALTH INFORMATION MANAGEMENT | Facility: MEDICAL CENTER | Age: 61
End: 2021-04-08
Payer: COMMERCIAL

## 2021-04-08 DIAGNOSIS — E11.65 TYPE 2 DIABETES MELLITUS WITH HYPERGLYCEMIA, WITHOUT LONG-TERM CURRENT USE OF INSULIN (HCC): ICD-10-CM

## 2021-04-08 LAB
ALBUMIN SERPL BCP-MCNC: 4.5 G/DL (ref 3.2–4.9)
ALBUMIN/GLOB SERPL: 1.7 G/DL
ALP SERPL-CCNC: 90 U/L (ref 30–99)
ALT SERPL-CCNC: 24 U/L (ref 2–50)
ANION GAP SERPL CALC-SCNC: 7 MMOL/L (ref 7–16)
AST SERPL-CCNC: 25 U/L (ref 12–45)
BASOPHILS # BLD AUTO: 0.4 % (ref 0–1.8)
BASOPHILS # BLD: 0.03 K/UL (ref 0–0.12)
BILIRUB SERPL-MCNC: 0.5 MG/DL (ref 0.1–1.5)
BUN SERPL-MCNC: 17 MG/DL (ref 8–22)
CALCIUM SERPL-MCNC: 9.4 MG/DL (ref 8.5–10.5)
CHLORIDE SERPL-SCNC: 108 MMOL/L (ref 96–112)
CHOLEST SERPL-MCNC: 137 MG/DL (ref 100–199)
CO2 SERPL-SCNC: 28 MMOL/L (ref 20–33)
CREAT SERPL-MCNC: 1.03 MG/DL (ref 0.5–1.4)
CREAT UR-MCNC: 135.28 MG/DL
EOSINOPHIL # BLD AUTO: 0.16 K/UL (ref 0–0.51)
EOSINOPHIL NFR BLD: 2.4 % (ref 0–6.9)
ERYTHROCYTE [DISTWIDTH] IN BLOOD BY AUTOMATED COUNT: 44.9 FL (ref 35.9–50)
FASTING STATUS PATIENT QL REPORTED: NORMAL
GLOBULIN SER CALC-MCNC: 2.6 G/DL (ref 1.9–3.5)
GLUCOSE SERPL-MCNC: 121 MG/DL (ref 65–99)
HCT VFR BLD AUTO: 48.7 % (ref 42–52)
HCV AB SER QL: NORMAL
HDLC SERPL-MCNC: 33 MG/DL
HGB BLD-MCNC: 16 G/DL (ref 14–18)
IMM GRANULOCYTES # BLD AUTO: 0.03 K/UL (ref 0–0.11)
IMM GRANULOCYTES NFR BLD AUTO: 0.4 % (ref 0–0.9)
LDLC SERPL CALC-MCNC: 75 MG/DL
LYMPHOCYTES # BLD AUTO: 1.51 K/UL (ref 1–4.8)
LYMPHOCYTES NFR BLD: 22.3 % (ref 22–41)
MCH RBC QN AUTO: 30.7 PG (ref 27–33)
MCHC RBC AUTO-ENTMCNC: 32.9 G/DL (ref 33.7–35.3)
MCV RBC AUTO: 93.3 FL (ref 81.4–97.8)
MICROALBUMIN UR-MCNC: <1.2 MG/DL
MICROALBUMIN/CREAT UR: NORMAL MG/G (ref 0–30)
MONOCYTES # BLD AUTO: 0.49 K/UL (ref 0–0.85)
MONOCYTES NFR BLD AUTO: 7.2 % (ref 0–13.4)
NEUTROPHILS # BLD AUTO: 4.54 K/UL (ref 1.82–7.42)
NEUTROPHILS NFR BLD: 67.3 % (ref 44–72)
NRBC # BLD AUTO: 0 K/UL
NRBC BLD-RTO: 0 /100 WBC
PLATELET # BLD AUTO: 302 K/UL (ref 164–446)
PMV BLD AUTO: 11.6 FL (ref 9–12.9)
POTASSIUM SERPL-SCNC: 4.8 MMOL/L (ref 3.6–5.5)
PROT SERPL-MCNC: 7.1 G/DL (ref 6–8.2)
RBC # BLD AUTO: 5.22 M/UL (ref 4.7–6.1)
SODIUM SERPL-SCNC: 143 MMOL/L (ref 135–145)
TRIGL SERPL-MCNC: 145 MG/DL (ref 0–149)
TSH SERPL DL<=0.005 MIU/L-ACNC: 1.01 UIU/ML (ref 0.38–5.33)
WBC # BLD AUTO: 6.8 K/UL (ref 4.8–10.8)

## 2021-04-08 PROCEDURE — G0109 DIAB MANAGE TRN IND/GROUP: HCPCS | Performed by: INTERNAL MEDICINE

## 2021-04-08 NOTE — ASSESSMENT & PLAN NOTE
This is a chronic and stable condition  Adequately controlled on lisinopril 10 mg daily blood pressure today well within normal limits at 124/88  He has expressed to me that he would eventually like to get off of most of his medications and take a more holistic approach  We did discuss coming off medications and the possibility of this along with healthy lifestyle modifications including healthy diet, regular exercise and efforts towards weight loss  For right now he agrees to stay on the medications

## 2021-04-08 NOTE — ASSESSMENT & PLAN NOTE
This is a chronic condition, under excellent control, takes Metformin 500 mg twice a day  A1c in office today 5.9  He does understand this is in a prediabetes range and expresses to me that he would eventually like to get off the medication  In addition to healthy lifestyle modifications we will continue on the Metformin but at only once a day  He will follow up in 3 to 4 months with labs done before visit  Appropriately on statin and ACE inhibitor

## 2021-04-08 NOTE — ASSESSMENT & PLAN NOTE
The 10-year ASCVD risk score (Krystal QUIROZ Jr., et al., 2013) is: 18.2%  Patient appropriately on statin, takes atorvastatin 10 mg daily  Due for labs, these have been ordered  Discussed the importance of ongoing healthy lifestyle modifications

## 2021-04-08 NOTE — ASSESSMENT & PLAN NOTE
Patient did suffer from pulmonary embolism back in January  Although DVT study was negative  He is followed by anticoagulation clinic and is on Xarelto  He is due to come off this medication very soon  Denies symptoms associated with this

## 2021-04-08 NOTE — PROGRESS NOTES
4/8/2021                              CHELSEY Veliz         50 y.o.              Time in/out: 1:00-4:00pm    Subjective:  -Here for day 2 Nutrition part of Type 2 Diabetes class     Nutrition Diagnosis (PES Statement)  · Altered nutrition related lab values related to endocrine dysfunction as evidenced by HgbA1c of 5.9    Client history:  Condition(s) associated with a diagnosis or treatment (specify): T2DM, GERD, DLD, HTN    Biochemical data, medical test and procedures  Lab Results   Component Value Date/Time    HBA1C 5.9 (A) 04/07/2021 10:13 AM   @  No results found for: POCGLUCOSE  Lab Results   Component Value Date/Time    CHOLSTRLTOT 137 04/07/2021 10:45 AM    LDL 75 04/07/2021 10:45 AM    HDL 33 (A) 04/07/2021 10:45 AM    TRIGLYCERIDE 145 04/07/2021 10:45 AM         Nutrition Intervention    Comprehensive Nutrition education Instruction or training leading to in-depth nutrition related knowledge about:  Benefits to following meal plan, Combine carb, protein and fat at each meal, Eating out, Fast food, Meal timing and spacing, Menu Planning, Metabolism of carb, protein, fat, Physical activity/exercise, Portion control, Sweets and alcohol in moderation, Heart-healthy guidelines, Label Reading, Handouts provided regarding topics discussed and Theraputic diet for Type 2 DM    Monitoring & Evaluation Plan    Behavioral-Environmental:  Behavior:  Consistent CHO intake throughout the day  Physical activity:  Increase as tolerated    Food / Nutrient Intake:  Food intake: Use the plate method recommendations for portions/balance at meals  Fluid/Beverage intake: Avoid all sweetened beverages unless utilizing to treat for low blood sugar   Macronutrient intake:  Up to 30-45 grams CHO with meals for women & 45-60 grams CHO for men, up to 15 grams CHO with snacks    Physical Signs / Symptoms:  HbA1c profiles:  Within ADA guidelines or per pt's endocrinologist       Assessment Notes:  Raheem attended day 2  of the Type 2 diabetes class today.  The pt was taught that exercise works as a medication for controlling DM.  Different exercises were shown that can be done easily at home, like walking in place, lifting light weights while sitting, etc.  It was emphasized that if exercise is a consistent part of the pt's habits that DM control will be the most ideal it can be.   Food was then discussed next, with a brief review of the patient's current eating habits. Nutrition basics was reviewed and they were taught the differences between CHO/protein/fat and their effects on BG's.  This information was related to the plate method to help with meal planning/portions at meals; the pt was also taught to use their hands as measuring tools (fist for starch, palm for protein) to help when eating out or on a large plate.  Non-starchy vegetables were emphasized as foods that will help satisfy without raising BG's at meals and snacks.  I stressed to the patient to not go more than 4 hours without eating and if a snack is necessary they were taught how to construct a proper snack of ~15 grams CHO and ~7 grams protein. They were instructed that non-starchy vegetables and protein are option for between meals as well if not trying to avoid low blood sugar.  Finally, we moved onto the food label and how to effectively read a label to evaluate a food.  An alternative way of meal planning was given by using the food label and CHO counting; the patient can eat up to 60 grams CHO at meals and up to 15 grams CHO at snacks, if needed. They were encouraged to adjust CHO intake according to activity level and blood sugar readings. Lastly, the pt set goals to try to achieve in the next 3 months to help with DM control and may follow-up with me, if needed, for a more intensive meal plan and CHO counting education.  F/u prn.

## 2021-04-08 NOTE — LETTER
RE:  Raheem Nunez   1960     Dear: Huma Chaudhari PA-C    The above referenced patient received 3 hours of diabetes education from [facility] on 21.    Topics taught (may include but not limited to):  Introduction to diabetes, benefits and responsibilities of patient, physiology of diabetes and the diease process, benefits of blood glucose monitoring and record keeping, medication action and possible side effects, hypoglycemia, sick day management, exercise, stress reduction, disaster preparedness,, acute and chronic conditions related to diabetes.   They were also seen by the registered dietitian for the following topics.    -The basics of nutrition and how foods affect blood sugar   -The plate method for portion control (¼ plate starch, ¼ plate protein, ½ plate non-starchy vegetables)  -Appropriate snacking recommendations  -Heart-healthy eating, including controlling/managing/improving hyperlipidemia and HTN  -Food label reading, including CHO counting    Provided meter and instructed in use: no.  Patient currently testing his/her blood sugarsno, has a new meter and instructed on how to use ([employer] brand)    Dilated eye exam within the past year: yes Goal is for patient to have yearly.   Dental exam within the past year no   Monofilament exam performed by HCP in the past year yes .  Patient currently checking their feet daily no   Most recent A1c 5.9 dated on 21, goal is for A1c to be less than 7  Current Medications used to manage diabetes  Metformin 500 mg bid  Patient currently exercisingyes Goal is for 30 minutes per day or 150 minutes per week.   Patient made any adjustments to eating since diagnosis of diabetes  yes   Eating less sugar, eating less food and eating more vegetables  Patient/caregiver appeared to understand the content as demonstrated by appropriate questions.     Thank you for allowing us to participate in this patients care.   Rita Kaur RN, [facility]  Certified  Diabetes Care and   [facility]    Lilly Humphreys RD  Registered Dietitian  Outpatient Dietary Educator  [facility]

## 2021-04-12 ENCOUNTER — ANTICOAGULATION VISIT (OUTPATIENT)
Dept: VASCULAR LAB | Facility: MEDICAL CENTER | Age: 61
End: 2021-04-12
Attending: INTERNAL MEDICINE
Payer: COMMERCIAL

## 2021-04-12 VITALS — HEART RATE: 63 BPM | SYSTOLIC BLOOD PRESSURE: 143 MMHG | DIASTOLIC BLOOD PRESSURE: 77 MMHG

## 2021-04-12 DIAGNOSIS — I26.90 ACUTE SEPTIC PULMONARY EMBOLISM WITHOUT ACUTE COR PULMONALE (HCC): ICD-10-CM

## 2021-04-12 PROCEDURE — 99212 OFFICE O/P EST SF 10 MIN: CPT | Performed by: NURSE PRACTITIONER

## 2021-04-12 PROCEDURE — 99214 OFFICE O/P EST MOD 30 MIN: CPT | Performed by: NURSE PRACTITIONER

## 2021-04-12 RX ORDER — ASPIRIN 81 MG/1
81 TABLET, CHEWABLE ORAL DAILY
COMMUNITY
End: 2021-06-02

## 2021-04-12 ASSESSMENT — ENCOUNTER SYMPTOMS
SHORTNESS OF BREATH: 0
MYALGIAS: 0
FEVER: 0
CLAUDICATION: 0
FALLS: 0
BLOOD IN STOOL: 0
CHILLS: 0
LOSS OF CONSCIOUSNESS: 0
BRUISES/BLEEDS EASILY: 0
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
## 2021-04-12 NOTE — PATIENT INSTRUCTIONS
- stop taking Xarelto  - start taking aspirin 81 mg by mouth daily  - go to the ER for shortness of breath, chest pain, pain with deep inhalation, new onset leg swelling and/or pain in calf or leg   - avoid sedentary periods  - let all your providers know you have a history of a pulmonary embolism  - continue complete avoidance of tobacco products  - avoid hormonal therapies including estrogen or testosterone-containing meds, or raloxifene or tamoxifene (commonly used for osteoporosis)  - if any bleeding lasting 30min without stopping, please seek care with your PCP, urgent care, or ED  - if having any invasive procedure, please make sure the doctor knows of your history of blood clots and current anticoagulation status  - keep up with age-appropriate cancer screenings as a small % of blood clots may be caused by an underlying malignancy

## 2021-06-02 ENCOUNTER — OFFICE VISIT (OUTPATIENT)
Dept: URGENT CARE | Facility: PHYSICIAN GROUP | Age: 61
End: 2021-06-02
Payer: COMMERCIAL

## 2021-06-02 VITALS
BODY MASS INDEX: 28.72 KG/M2 | RESPIRATION RATE: 16 BRPM | HEIGHT: 67 IN | OXYGEN SATURATION: 96 % | HEART RATE: 81 BPM | TEMPERATURE: 97.8 F | WEIGHT: 183 LBS | SYSTOLIC BLOOD PRESSURE: 102 MMHG | DIASTOLIC BLOOD PRESSURE: 66 MMHG

## 2021-06-02 DIAGNOSIS — S01.511A LIP LACERATION, INITIAL ENCOUNTER: ICD-10-CM

## 2021-06-02 PROCEDURE — 12014 RPR F/E/E/N/L/M 5.1-7.5 CM: CPT | Performed by: FAMILY MEDICINE

## 2021-06-02 RX ORDER — AMOXICILLIN AND CLAVULANATE POTASSIUM 875; 125 MG/1; MG/1
1 TABLET, FILM COATED ORAL 2 TIMES DAILY
Qty: 10 TABLET | Refills: 0 | Status: SHIPPED | OUTPATIENT
Start: 2021-06-02 | End: 2021-06-07

## 2021-06-02 ASSESSMENT — FIBROSIS 4 INDEX: FIB4 SCORE: 1.03

## 2021-06-02 NOTE — PROGRESS NOTES
"Subjective:      Raheem Nunez is a 60 y.o. male who presents with Laceration (On face )      - This is a pleasant and nontoxic appearing 60 y.o. male with c/o working on tractor lifting some building material (sheet metal) and it fell hitting him in face causing a wound to Rt side of upper and lower lip. ~ 45 min ago. Last tetanus booster 2019. No LOC or significant injury or pain injury mentioned elsewhere       ALLERGIES:  Patient has no known allergies.     PMH:  Past Medical History:   Diagnosis Date   • Asthma    • Diabetes (HCC)    • GERD (gastroesophageal reflux disease)    • Hyperlipidemia    • Hypertension    • Pulmonary embolism (HCC)         PSH:  History reviewed. No pertinent surgical history.    MEDS:    Current Outpatient Medications:   •  amoxicillin-clavulanate (AUGMENTIN) 875-125 MG Tab, Take 1 tablet by mouth 2 times a day for 5 days., Disp: 10 tablet, Rfl: 0  •  atorvastatin (LIPITOR) 10 MG Tab, Take 1 tablet by mouth every day., Disp: 30 tablet, Rfl: 3  •  lisinopril (PRINIVIL) 10 MG Tab, Take 1 tablet by mouth every day., Disp: 30 tablet, Rfl: 3  •  metFORMIN (GLUCOPHAGE) 500 MG Tab, Take 1 tablet by mouth every day., Disp: 30 tablet, Rfl: 3  •  Cholecalciferol (VITAMIN D3 PO), Take 1 Tab by mouth every morning., Disp: , Rfl:   •  Multiple Vitamins-Minerals (MULTIVITAMIN ADULT PO), Take 1 Tab by mouth every morning., Disp: , Rfl:   •  omeprazole (PRILOSEC OTC) 20 MG tablet, Take 20 mg by mouth every day., Disp: , Rfl:     ** I have documented what I find to be significant in regards to past medical, social, family and surgical history  in my HPI or under PMH/PSH/FH review section, otherwise it is noncontributory **           HPI    Review of Systems   Skin:        Cut lip   All other systems reviewed and are negative.         Objective:     /66   Pulse 81   Temp 36.6 °C (97.8 °F) (Temporal)   Resp 16   Ht 1.702 m (5' 7\")   Wt 83 kg (183 lb)   SpO2 96%   BMI 28.66 kg/m²      "     Physical Exam  Vitals and nursing note reviewed.   Constitutional:       General: He is not in acute distress.     Appearance: Normal appearance. He is well-developed.   HENT:      Head: Normocephalic.        Comments: ~3cm lac upper lip.   ~4cm lac/skin tear on and extending below Rt lower lip  Cardiovascular:      Heart sounds: Normal heart sounds. No murmur heard.     Pulmonary:      Effort: Pulmonary effort is normal. No respiratory distress.   Skin:     Coloration: Skin is not jaundiced or pale.   Neurological:      Mental Status: He is alert.      Motor: No abnormal muscle tone.   Psychiatric:         Mood and Affect: Mood normal.         Behavior: Behavior normal.            Assessment/Plan:          1. Lip laceration, initial encounter  amoxicillin-clavulanate (AUGMENTIN) 875-125 MG Tab       Procedure: Laceration Repair       - Wound cleaned and/or irrigated w/ NS by MA.  - Clean technique used for procedure   - Local anesthesia with 2% lidocaine  - Closed with #10  6-0 Nylon interrupted sutures with good wound approximation. Care was taken not to disturb/injure underlying bones, joint, joint-space, tendons, nerves and major vessels   - Polysporin and dressing placed  - Patient tolerated well  - wound care/instructions discussed  - 2 day wound check advised

## 2021-06-03 ENCOUNTER — OFFICE VISIT (OUTPATIENT)
Dept: URGENT CARE | Facility: PHYSICIAN GROUP | Age: 61
End: 2021-06-03
Payer: COMMERCIAL

## 2021-06-03 ENCOUNTER — HOSPITAL ENCOUNTER (OUTPATIENT)
Dept: RADIOLOGY | Facility: MEDICAL CENTER | Age: 61
End: 2021-06-03
Attending: FAMILY MEDICINE
Payer: COMMERCIAL

## 2021-06-03 VITALS
DIASTOLIC BLOOD PRESSURE: 64 MMHG | HEART RATE: 64 BPM | TEMPERATURE: 98.4 F | RESPIRATION RATE: 20 BRPM | HEIGHT: 67 IN | OXYGEN SATURATION: 98 % | BODY MASS INDEX: 28.72 KG/M2 | SYSTOLIC BLOOD PRESSURE: 116 MMHG | WEIGHT: 183 LBS

## 2021-06-03 DIAGNOSIS — S67.21XA CRUSHING INJURY OF RIGHT HAND, INITIAL ENCOUNTER: ICD-10-CM

## 2021-06-03 PROCEDURE — 73130 X-RAY EXAM OF HAND: CPT | Mod: RT

## 2021-06-03 PROCEDURE — 99213 OFFICE O/P EST LOW 20 MIN: CPT | Performed by: FAMILY MEDICINE

## 2021-06-03 ASSESSMENT — FIBROSIS 4 INDEX: FIB4 SCORE: 1.03

## 2021-06-03 NOTE — PROGRESS NOTES
"Subjective:      Chief Complaint   Patient presents with   • Hand Pain     right hand swollen\" pt states he was trying to catch some metal form a tracker, tried to stiop it with hand\" 1600 yesterday              hand Injury        States hand was caught between two metal, heavy objects while trying to load them into truck yesterday.    Now c/o constant, mild, but throbbing rt hand pain, worse when he tries to use the hand.       The pain does not radiate. The pain is mild. Pertinent negatives include no chest pain, muscle weakness, numbness or tingling. The symptoms are aggravated by movement and palpation. Pt has tried nothing for the symptoms.        Past Medical History:   Diagnosis Date   • Asthma    • Diabetes (HCC)    • GERD (gastroesophageal reflux disease)    • Hyperlipidemia    • Hypertension    • Pulmonary embolism (HCC)            Review of Systems   Constitutional: Negative for fever.   Respiratory: Negative for shortness of breath.    Cardiovascular: Negative for chest pain.   Neurological: Negative for tingling and numbness.   10 point ROS otherwise negative, except per HPI           Objective:     /64 (BP Location: Right arm, Patient Position: Sitting, BP Cuff Size: Adult)   Pulse 64   Temp 36.9 °C (98.4 °F) (Temporal)   Resp 20   Ht 1.702 m (5' 7\")   Wt 83 kg (183 lb)   SpO2 98%       Physical Exam   Constitutional: pt is oriented to person, place, and time. Pt appears well-developed and well-nourished. No distress.   HENT:   Head: Normocephalic and atraumatic.   Eyes: Conjunctivae are normal.   Cardiovascular: Normal rate.    Pulmonary/Chest: Effort normal.   Musculoskeletal:        Rt hand :        Left hand: Normal sensation noted. Normal strength noted.  + TTP over dorsum and palm and minor bruising noted     .  normal range of motion and no crepitus.     Neurological: pt is alert and oriented to person, place, and time.   Skin: Skin is warm. Pt is not diaphoretic. No erythema. "   Nursing note and vitals reviewed.       DX-HAND 3+ RIGHT  Order: [id number]  Status:  Final result   Visible to patient:  No (scheduled for 6/4/2021 10:28 AM) Next appt:  [facility]21/2021 [facility] Dx:  Crushing injury of right hand, initia...   0 Result Notes  Details    Reading Physician Reading Date Result Priority   Staci Norton M.D.  [phone] 6/3/2021 Urgent Care      Narrative & Impression     6/3/2021 12:09 PM     HISTORY/REASON FOR EXAM:  Right hand pain and swelling after injury yesterday.        TECHNIQUE/EXAM DESCRIPTION AND NUMBER OF VIEWS:  3 views of the RIGHT hand.     COMPARISON: 11/2/2009     FINDINGS:     There is no focal soft tissue swelling.     There is no evidence for displaced fracture or dislocation. There is a questionable lucency projecting through the 2nd proximal phalanx distally on the lateral view which is most likely artifactual due to overlying soft tissue shadows as there is no   fracture seen on the AP or oblique view in this region.     The alignment is maintained.     There is mild degenerative change in the right 1st MCP joint. There is mild joint space narrowing of the DIP joints and 5th PIP joint.     IMPRESSION:     1.  There is no acute displaced fracture of the right hand.  2.  There is mild multifocal early degenerative change.         Last Resulted: 06/03/21 12:25 PM                  Assessment/Plan:        1. Crushing injury of right hand, initial encounter     X-rays were personally reviewed by myself.   There is no fracture, just arthritic changes as noted above.      otc tylenol, prn      Follow up in one week if no improvement, sooner if symptoms worsen.

## 2021-06-09 ENCOUNTER — OFFICE VISIT (OUTPATIENT)
Dept: URGENT CARE | Facility: PHYSICIAN GROUP | Age: 61
End: 2021-06-09
Payer: COMMERCIAL

## 2021-06-09 VITALS
WEIGHT: 193 LBS | RESPIRATION RATE: 18 BRPM | SYSTOLIC BLOOD PRESSURE: 126 MMHG | HEART RATE: 84 BPM | TEMPERATURE: 97.8 F | DIASTOLIC BLOOD PRESSURE: 74 MMHG | OXYGEN SATURATION: 97 % | BODY MASS INDEX: 30.23 KG/M2

## 2021-06-09 DIAGNOSIS — Z48.02 VISIT FOR SUTURE REMOVAL: ICD-10-CM

## 2021-06-09 PROCEDURE — 99212 OFFICE O/P EST SF 10 MIN: CPT | Performed by: PHYSICIAN ASSISTANT

## 2021-06-09 ASSESSMENT — FIBROSIS 4 INDEX: FIB4 SCORE: 1.03

## 2021-06-09 NOTE — PROGRESS NOTES
Chief Complaint   Patient presents with   • Suture / Staple Removal     were placed last week       HISTORY OF PRESENT ILLNESS: Patient is a 60 y.o. male who presents today for the following:    Here for suture removal  Placed around his mouth 6/2  Not work related  No complaints     Patient Active Problem List    Diagnosis Date Noted   • Abnormal WBC count 03/25/2021   • Splenomegaly 02/02/2021   • Open wound of right knee 02/02/2021   • Type 2 diabetes mellitus with hyperglycemia, without long-term current use of insulin (HCC) 01/05/2021   • Other hyperlipidemia 01/05/2021   • Essential hypertension 01/05/2021   • Trauma 12/31/2020   • Pulmonary embolism (HCC) 12/31/2020   • Closed fracture of multiple ribs of both sides 12/31/2020   • Bladder wall thickening 12/31/2020   • GERD (gastroesophageal reflux disease) 12/31/2020   • Dyslipidemia 02/21/2012   • Elevated fasting glucose 01/20/2012   • ASTHMA 01/20/2012       Allergies:Patient has no known allergies.    Current Outpatient Medications Ordered in Epic   Medication Sig Dispense Refill   • atorvastatin (LIPITOR) 10 MG Tab Take 1 tablet by mouth every day. 30 tablet 3   • lisinopril (PRINIVIL) 10 MG Tab Take 1 tablet by mouth every day. 30 tablet 3   • metFORMIN (GLUCOPHAGE) 500 MG Tab Take 1 tablet by mouth every day. 30 tablet 3   • Cholecalciferol (VITAMIN D3 PO) Take 1 Tab by mouth every morning.     • Multiple Vitamins-Minerals (MULTIVITAMIN ADULT PO) Take 1 Tab by mouth every morning.     • omeprazole (PRILOSEC OTC) 20 MG tablet Take 20 mg by mouth every day.       No current [facility]-ordered facility-administered medications on file.       Past Medical History:   Diagnosis Date   • Asthma    • Diabetes (HCC)    • GERD (gastroesophageal reflux disease)    • Hyperlipidemia    • Hypertension    • Pulmonary embolism (HCC)        Social History     Tobacco Use   • Smoking status: Never Smoker   • Smokeless tobacco: Never Used   Vaping Use   • Vaping Use: Never  used   Substance Use Topics   • Alcohol use: Yes     Comment: rarely   • Drug use: Never       Family Status   Relation Name Status   • Fa  (Not Specified)     Family History   Problem Relation Age of Onset   • Lung Disease Father        Review of Systems:    Constitutional ROS: No unexpected change in weight, No weakness, No fatigue  Pulmonary ROS: No chronic cough, sputum, or hemoptysis, No dyspnea on exertion, No wheezing  Cardiovascular ROS: No diaphoresis, No edema, No palpitations  Hematologic/Lymphatic ROS: No chills, No night sweats, No weight loss  Skin/Integumentary ROS: sutures in face      Exam:  /74   Pulse 84   Temp 36.6 °C (97.8 °F)   Resp 18   Wt 87.5 kg (193 lb)   SpO2 97%   General: Well developed, well nourished. No distress.    HENT: Head is grossly normal.  Pulmonary: Unlabored respiratory effort.   Neurologic: Grossly nonfocal. No facial asymmetry noted.  Skin:  Sutures removed from upper and lower lip, right side. Healing appropriately. No signs of infection noted.  Psych: Normal mood. Alert and oriented to person, place and time.    Assessment/Plan:  Discussed wound care at home. Follow up as needed.  1. Visit for suture removal

## 2021-07-21 ENCOUNTER — OFFICE VISIT (OUTPATIENT)
Dept: MEDICAL GROUP | Facility: PHYSICIAN GROUP | Age: 61
End: 2021-07-21
Payer: COMMERCIAL

## 2021-07-21 VITALS
OXYGEN SATURATION: 98 % | SYSTOLIC BLOOD PRESSURE: 122 MMHG | HEIGHT: 67 IN | HEART RATE: 78 BPM | BODY MASS INDEX: 29.16 KG/M2 | TEMPERATURE: 98 F | WEIGHT: 185.8 LBS | DIASTOLIC BLOOD PRESSURE: 80 MMHG

## 2021-07-21 DIAGNOSIS — E11.65 TYPE 2 DIABETES MELLITUS WITH HYPERGLYCEMIA, WITHOUT LONG-TERM CURRENT USE OF INSULIN (HCC): ICD-10-CM

## 2021-07-21 DIAGNOSIS — I10 ESSENTIAL HYPERTENSION: ICD-10-CM

## 2021-07-21 DIAGNOSIS — Z76.89 ENCOUNTER TO ESTABLISH CARE: ICD-10-CM

## 2021-07-21 DIAGNOSIS — E78.49 OTHER HYPERLIPIDEMIA: ICD-10-CM

## 2021-07-21 PROCEDURE — 99214 OFFICE O/P EST MOD 30 MIN: CPT | Performed by: NURSE PRACTITIONER

## 2021-07-21 ASSESSMENT — FIBROSIS 4 INDEX: FIB4 SCORE: 1.03

## 2021-07-21 NOTE — ASSESSMENT & PLAN NOTE
Doing well. Reviewed labs with the patient.  Currently taking atorvastatin 10 mg tablet and is taking medications as prescribed.  Refills needed  no myalgias.

## 2021-07-21 NOTE — PATIENT INSTRUCTIONS
· Recheck A1C in 3 months and if still stable we will take you off the metformin     Diabetes Basics    Diabetes (diabetes mellitus) is a long-term (chronic) disease. It occurs when the body does not properly use sugar (glucose) that is released from food after you eat.  Diabetes may be caused by one or both of these problems:  · Your pancreas does not make enough of a hormone called insulin.  · Your body does not react in a normal way to insulin that it makes.  Insulin lets sugars (glucose) go into cells in your body. This gives you energy. If you have diabetes, sugars cannot get into cells. This causes high blood sugar (hyperglycemia).  Follow these instructions at home:  How is diabetes treated?  You may need to take insulin or other diabetes medicines daily to keep your blood sugar in balance. Take your diabetes medicines every day as told by your doctor. List your diabetes medicines here:  Diabetes medicines  · Name of medicine: ______________________________  ? Amount (dose): _______________ Time (a.m./p.m.): _______________ Notes: ___________________________________  · Name of medicine: ______________________________  ? Amount (dose): _______________ Time (a.m./p.m.): _______________ Notes: ___________________________________  · Name of medicine: ______________________________  ? Amount (dose): _______________ Time (a.m./p.m.): _______________ Notes: ___________________________________  If you use insulin, you will learn how to give yourself insulin by injection. You may need to adjust the amount based on the food that you eat. List the types of insulin you use here:  Insulin  · Insulin type: ______________________________  ? Amount (dose): _______________ Time (a.m./p.m.): _______________ Notes: ___________________________________  · Insulin type: ______________________________  ? Amount (dose): _______________ Time (a.m./p.m.): _______________ Notes: ___________________________________  · Insulin type:  ______________________________  ? Amount (dose): _______________ Time (a.m./p.m.): _______________ Notes: ___________________________________  · Insulin type: ______________________________  ? Amount (dose): _______________ Time (a.m./p.m.): _______________ Notes: ___________________________________  · Insulin type: ______________________________  ? Amount (dose): _______________ Time (a.m./p.m.): _______________ Notes: ___________________________________  How do I manage my blood sugar?    Check your blood sugar levels using a blood glucose monitor as directed by your doctor.  Your doctor will set treatment goals for you. Generally, you should have these blood sugar levels:  · Before meals (preprandial):  mg/dL (4.4-7.2 mmol/L).  · After meals (postprandial): below 180 mg/dL (10 mmol/L).  · A1c level: less than 7%.  Write down the times that you will check your blood sugar levels:  Blood sugar checks  · Time: _______________ Notes: ___________________________________  · Time: _______________ Notes: ___________________________________  · Time: _______________ Notes: ___________________________________  · Time: _______________ Notes: ___________________________________  · Time: _______________ Notes: ___________________________________  · Time: _______________ Notes: ___________________________________    What do I need to know about low blood sugar?  Low blood sugar is called hypoglycemia. This is when blood sugar is at or below 70 mg/dL (3.9 mmol/L). Symptoms may include:  · Feeling:  ? Hungry.  ? Worried or nervous (anxious).  ? Sweaty and clammy.  ? Confused.  ? Dizzy.  ? Sleepy.  ? Sick to your stomach (nauseous).  · Having:  ? A fast heartbeat.  ? A headache.  ? A change in your vision.  ? Tingling or no feeling (numbness) around the mouth, lips, or tongue.  ? Jerky movements that you cannot control (seizure).  · Having trouble with:  ? Moving (coordination).  ? Sleeping.  ? Passing out  (fainting).  ? Getting upset easily (irritability).  Treating low blood sugar  To treat low blood sugar, eat or drink something sugary right away. If you can think clearly and swallow safely, follow the 15:15 rule:  · Take 15 grams of a fast-acting carb (carbohydrate). Talk with your doctor about how much you should take.  · Some fast-acting carbs are:  ? Sugar tablets (glucose pills). Take 3-4 glucose pills.  ? 6-8 pieces of hard candy.  ? 4-6 oz (120-150 mL) of fruit juice.  ? 4-6 oz (120-150 mL) of regular (not diet) soda.  ? 1 Tbsp (15 mL) honey or sugar.  · Check your blood sugar 15 minutes after you take the carb.  · If your blood sugar is still at or below 70 mg/dL (3.9 mmol/L), take 15 grams of a carb again.  · If your blood sugar does not go above 70 mg/dL (3.9 mmol/L) after 3 tries, get help right away.  · After your blood sugar goes back to normal, eat a meal or a snack within 1 hour.  Treating very low blood sugar  If your blood sugar is at or below 54 mg/dL (3 mmol/L), you have very low blood sugar (severe hypoglycemia). This is an emergency. Do not wait to see if the symptoms will go away. Get medical help right away. Call your local emergency services (911 in the [place] Do not drive yourself to the hospital.  Questions to ask your health care provider  · Do I need to meet with a diabetes educator?  · What equipment will I need to care for myself at home?  · What diabetes medicines do I need? When should I take them?  · How often do I need to check my blood sugar?  · What number can I call if I have questions?  · When is my next doctor's visit?  · Where can I find a support group for people with diabetes?  Where to find more information  · American Diabetes Association: www.diabetes.org  · American Association of Diabetes Educators: www.diabeteseducator.org/patient-resources  Contact a doctor if:  · Your blood sugar is at or above 240 mg/dL (13.3 mmol/L) for 2 days in a row.  · You have been sick or  have had a fever for 2 days or more, and you are not getting better.  · You have any of these problems for more than 6 hours:  ? You cannot eat or drink.  ? You feel sick to your stomach (nauseous).  ? You throw up (vomit).  ? You have watery poop (diarrhea).  Get help right away if:  · Your blood sugar is lower than 54 mg/dL (3 mmol/L).  · You get confused.  · You have trouble:  ? Thinking clearly.  ? Breathing.  Summary  · Diabetes (diabetes mellitus) is a long-term (chronic) disease. It occurs when the body does not properly use sugar (glucose) that is released from food after digestion.  · Take insulin and diabetes medicines as told.  · Check your blood sugar every day, as often as told.  · Keep all follow-up visits as told by your doctor. This is important.  This information is not intended to replace advice given to you by your health care provider. Make sure you discuss any questions you have with your health care provider.  Document Released: 03/22/2019 Document Revised: 02/07/2020 Document Reviewed: 03/22/2019  [employer] Patient Education © 2020 [employer]      MyPlate from [employer]    MyPlate is an outline of a general healthy diet based on the 2010 Dietary Guidelines for Americans, from the [place] Department of Agriculture (USDA). It sets guidelines for how much food you should eat from each food group based on your age, sex, and level of physical activity.  What are tips for following MyPlate?  To follow MyPlate recommendations:  · Eat a wide variety of fruits and vegetables, grains, and protein foods.  · Serve smaller portions and eat less food throughout the day.  · Limit portion sizes to avoid overeating.  · Enjoy your food.  · Get at least 150 minutes of exercise every week. This is about 30 minutes each day, 5 or more days per week.  It can be difficult to have every meal look like MyPlate. Think about MyPlate as eating guidelines for an entire day, rather than each individual meal.  Fruits and  vegetables  · Make half of your plate fruits and vegetables.  · Eat many different colors of fruits and vegetables each day.  · For a 2,000 calorie daily food plan, eat:  ? 2½ cups of vegetables every day.  ? 2 cups of fruit every day.  · 1 cup is equal to:  ? 1 cup raw or cooked vegetables.  ? 1 cup raw fruit.  ? 1 medium-sized orange, apple, or banana.  ? 1 cup 100% fruit or vegetable juice.  ? 2 cups raw leafy greens, such as lettuce, spinach, or kale.  ? ½ cup dried fruit.  Grains  · One fourth of your plate should be grains.  · Make at least half of the grains you eat each day whole grains.  · For a 2,000 calorie daily food plan, eat 6 oz of grains every day.  · 1 oz is equal to:  ? 1 slice bread.  ? 1 cup cereal.  ? ½ cup cooked rice, cereal, or pasta.  Protein  · One fourth of your plate should be protein.  · Eat a wide variety of protein foods, including meat, poultry, fish, eggs, beans, nuts, and tofu.  · For a 2,000 calorie daily food plan, eat 5½ oz of protein every day.  · 1 oz is equal to:  ? 1 oz meat, poultry, or fish.  ? ¼ cup cooked beans.  ? 1 egg.  ? ½ oz nuts or seeds.  ? 1 Tbsp peanut butter.  Dairy  · Drink fat-free or low-fat (1%) milk.  · Eat or drink dairy as a side to meals.  · For a 2,000 calorie daily food plan, eat or drink 3 cups of dairy every day.  · 1 cup is equal to:  ? 1 cup milk, yogurt, cottage cheese, or soy milk (soy beverage).  ? 2 oz processed cheese.  ? 1½ oz natural cheese.  Fats, oils, salt, and sugars  · Only small amounts of oils are recommended.  · Avoid foods that are high in calories and low in nutritional value (empty calories), like foods high in fat or added sugars.  · Choose foods that are low in salt (sodium). Choose foods that have less than 140 milligrams (mg) of sodium per serving.  · Drink water instead of sugary drinks. Drink enough water each day to keep your urine pale yellow.  Where to find support  · Work with your health care provider or a nutrition  specialist (dietitian) to develop a customized eating plan that is right for you.  · Download an angel (mobile application) to help you track your daily food intake.  Where to find more information  · Go to ChooseMyPlate.gov for more information.  · Learn more and log your daily food intake according to MyPlate using USDA's [employer]: www.[employer].usda.gov  Summary  · MyPlate is a general guideline for healthy eating from the USDA. It is based on the 2010 Dietary Guidelines for Americans.  · In general, fruits and vegetables should take up ½ of your plate, grains should take up ¼ of your plate, and protein should take up ¼ of your plate.  This information is not intended to replace advice given to you by your health care provider. Make sure you discuss any questions you have with your health care provider.  Document Released: 01/06/2009 Document Revised: 01/19/2019 Document Reviewed: 03/19/2018  [employer] Patient Education © 2020 [employer]

## 2021-07-21 NOTE — PROGRESS NOTES
Chief Complaint   Patient presents with   • Follow-Up       Subjective:     HPI:   Ryan Nunez is a 60 y.o. male here to discuss the evaluation and management of:        Type 2 diabetes mellitus with hyperglycemia, without long-term current use of insulin (HCC)  This is a chronic condition.  Current medications: Metformin 500 mg once daily.  Patient is tolerating it well.  No refills needed at this time.  Component      Latest Ref Rng & Units 4/7/2021   Creatinine, Urine      mg/dL 135.28   Microalbumin, Urine Random      mg/dL <1.2   Micro Alb Creat Ratio      0 - 30 mg/g see below   Glycohemoglobin      0.0 - 5.6 % 5.9 (A)     Component      Latest Ref Rng & Units 4/7/2021   Glucose      65 - 99 mg/dL 121 (H)     Last retinal eye exam: ordered today  ACEi/ARB?  Lisinopril 10 mg tablet daily.  Patient is tolerating this well.  No refill needed at this time.  Statin?  Currently taking atorvastatin 10 mg tablet daily.  Patient is tolerating this medication well denies any myalgia.  No refills needed at this time.    Aspirin? none  Concomitant HTN? At goal 122/80  Nightly foot checks? Yes    Patient would like to get off Metformin.  I discussed checking A1c level in 3 months we will decide at that point if we can take him off Metformin.  Patient reports that he has changed his diet to diabetic friendly and has been going to the gym regularly.  He reports a weight loss of approximately 25 pounds since January.  I congratulated him on his efforts and encouraged him to continue up his healthy lifestyle modifications.  Patient was agreeable with this plan of care.      Other hyperlipidemia  Doing well. Reviewed labs with the patient.  Currently taking atorvastatin 10 mg tablet and is taking medications as prescribed.  Refills needed  no myalgias.        Essential hypertension  Stable. Monitoring BP at home. Currently taking lisinopril 10 mg tablet as directed.    Denies lightheadedness, vision changes, headache,  palpitations or leg swelling.    Patient did discuss wanting to get off all medications the future.  Did discuss with him reevaluating A1c level in 3 months and if it is at goal we will take him off Metformin an additional 3 months recheck all labs and discuss taking him off lisinopril and atorvastatin at that time.  Patient was agreeable with this plan of care.        ROS:  Gen: no fevers/chills, no changes in weight  Eyes: no changes in vision  ENT: no sore throat, no hearing loss, no bloody nose  Pulm: no sob, no cough  CV: no chest pain, no palpitations  GI: no nausea/vomiting, no diarrhea  : no dysuria  MSk: no myalgias  Skin: no rash  Neuro: no headaches, no numbness/tingling  Heme/Lymph: no easy bruising    No Known Allergies    Current medicines (including changes today)  Current Outpatient Medications   Medication Sig Dispense Refill   • atorvastatin (LIPITOR) 10 MG Tab Take 1 tablet by mouth every day. 30 tablet 3   • lisinopril (PRINIVIL) 10 MG Tab Take 1 tablet by mouth every day. 30 tablet 3   • metFORMIN (GLUCOPHAGE) 500 MG Tab Take 1 tablet by mouth every day. 30 tablet 3   • Cholecalciferol (VITAMIN D3 PO) Take 1 Tab by mouth every morning.     • Multiple Vitamins-Minerals (MULTIVITAMIN ADULT PO) Take 1 Tab by mouth every morning.     • omeprazole (PRILOSEC OTC) 20 MG tablet Take 20 mg by mouth every day.       No current facility-administered medications for this visit.       Social History     Tobacco Use   • Smoking status: Never Smoker   • Smokeless tobacco: Never Used   Vaping Use   • Vaping Use: Never used   Substance Use Topics   • Alcohol use: Yes     Comment: rarely   • Drug use: Never       Patient Active Problem List    Diagnosis Date Noted   • Abnormal WBC count 03/25/2021   • Splenomegaly 02/02/2021   • Open wound of right knee 02/02/2021   • Type 2 diabetes mellitus with hyperglycemia, without long-term current use of insulin (HCC) 01/05/2021   • Other hyperlipidemia 01/05/2021   •  "Essential hypertension 01/05/2021   • Trauma 12/31/2020   • Pulmonary embolism (HCC) 12/31/2020   • Closed fracture of multiple ribs of both sides 12/31/2020   • Bladder wall thickening 12/31/2020   • GERD (gastroesophageal reflux disease) 12/31/2020   • Dyslipidemia 02/21/2012   • Elevated fasting glucose 01/20/2012   • ASTHMA 01/20/2012       Family History   Problem Relation Age of Onset   • Lung Disease Father    • No Known Problems Mother    • No Known Problems Sister    • No Known Problems Maternal Grandmother    • No Known Problems Maternal Grandfather    • No Known Problems Paternal Grandmother    • No Known Problems Paternal Grandfather           Objective:     No visits with results within 1 Month(s) from this visit.   Latest known visit with results is:   Hospital Outpatient Visit on 04/07/2021   Component Date Value Ref Range Status   • Sodium 04/07/2021 143  135 - 145 mmol/L Final   • Potassium 04/07/2021 4.8  3.6 - 5.5 mmol/L Final   • Chloride 04/07/2021 108  96 - 112 mmol/L Final   • Co2 04/07/2021 28  20 - 33 mmol/L Final   • Anion Gap 04/07/2021 7.0  7.0 - 16.0 Final   • Glucose 04/07/2021 121* 65 - 99 mg/dL Final   • Bun 04/07/2021 17  8 - 22 mg/dL Final   • Creatinine 04/07/2021 1.03  0.50 - 1.40 mg/dL Final   • Calcium 04/07/2021 9.4  8.5 - 10.5 mg/dL Final   • AST(SGOT) 04/07/2021 25  12 - 45 U/L Final   • ALT(SGPT) 04/07/2021 24  2 - 50 U/L Final   • Alkaline Phosphatase 04/07/2021 90  30 - 99 U/L Final   • Total Bilirubin 04/07/2021 0.5  0.1 - 1.5 mg/dL Final   • Albumin 04/07/2021 4.5  3.2 - 4.9 g/dL Final   • Total Protein 04/07/2021 7.1  6.0 - 8.2 g/dL Final   • Globulin 04/07/2021 2.6  1.9 - 3.5 g/dL Final   • A-G Ratio 04/07/2021 1.7  g/dL Final   • GFR If  04/07/2021 >60  >60 mL/min/1.73 m 2 Final   • GFR If Non  04/07/2021 >60  >60 mL/min/1.73 m 2 Final       /80   Pulse 78   Temp 36.7 °C (98 °F)   Ht 1.702 m (5' 7\")   Wt 84.3 kg (185 lb 12.8 " oz)   SpO2 98%  Body mass index is 29.1 kg/m².    Physical Exam:  Constitutional: Well-developed and well-nourished male in NAD. Not diaphoretic. No distress.   Skin: warm, dry, intact, no evidence of rash or concerning lesions  Head: Atraumatic without lesions.  Eyes: Conjunctivae and extraocular motions are normal. Pupils are equal, round, and reactive to light. No scleral icterus.   Ears:  External ears unremarkable.   Cardiovascular: Regular rate and rhythm without murmur. Radial pulses are intact and equal bilaterally.  Pulmonary: Clear to ausculation. Normal effort. No rales, ronchi, or wheezing.  Abdomen: Soft, non tender, and without distention. Active bowel sounds in all four quadrants.   Extremities: No cyanosis, clubbing, erythema, nor edema.   Neurological: Alert and oriented x 3. No cranial nerve deficit. 5/5 myotomes. Sensation intact.   Psychiatric:  Behavior, mood, and affect are appropriate.       Assessment and Plan:     The following treatment plan was discussed:    1. Encounter to establish care  Very pleasant 60-year-old male presents today to establish care.  Reviewed and updated all allergies, medical history, surgical history, family history, and social determinants of health.  Reviewed all medications no refills needed at this time.  Patient is up-to-date on his colonoscopy.  Discussed need for multiple vaccines however he declined at this time.    2. Type 2 diabetes mellitus with hyperglycemia, without long-term current use of insulin (HCC)  Patient continues to work on healthy lifestyle modifications and his A1c level in April was at goal.  Discussed with patient a 6-month plan which included in 3 months rechecking A1c and take him off Metformin if it is at goal then 3 months after that reevaluate A1c CMP, lipids, and blood pressure if all remain at goal we will take him off atorvastatin and Lipitor.  If he is unable to maintain normal lab work or blood pressures he is agreeing to stay on  medications.  I do feel this is appropriate.  This patient is extremely motivated to not be on medications and to change his lifestyle habits so that he is no longer diabetic.  - POCT Retinal Eye Exam  - HEMOGLOBIN A1C; Future    3. Other hyperlipidemia  Well controlled.   Continue statin medication and lifestyle modifications.  Continue to monitor.    4. Essential hypertension  Well controlled.    Continue statin medication and lifestyle modifications.  Continue to monitor.    Any change or worsening of signs or symptoms, patient encouraged to follow-up or report to emergency room for further evaluation. Patient verbalizes understanding and agrees.    Follow-Up: Return in about 3 months (around 10/21/2021) for DM, A1C.      PLEASE NOTE: This dictation was created using voice recognition software. I have made every reasonable attempt to correct obvious errors, but I expect that there are errors of grammar and possibly content that I did not discover before finalizing the note.

## 2021-07-21 NOTE — ASSESSMENT & PLAN NOTE
This is a chronic condition.  Current medications: Metformin 500 mg once daily.  Patient is tolerating it well.  No refills needed at this time.  Component      Latest Ref Rng & Units 4/7/2021   Creatinine, Urine      mg/dL 135.28   Microalbumin, Urine Random      mg/dL <1.2   Micro Alb Creat Ratio      0 - 30 mg/g see below   Glycohemoglobin      0.0 - 5.6 % 5.9 (A)     Component      Latest Ref Rng & Units 4/7/2021   Glucose      65 - 99 mg/dL 121 (H)     Last retinal eye exam: ordered today  ACEi/ARB?  Lisinopril 10 mg tablet daily.  Patient is tolerating this well.  No refill needed at this time.  Statin?  Currently taking atorvastatin 10 mg tablet daily.  Patient is tolerating this medication well denies any myalgia.  No refills needed at this time.    Aspirin? none  Concomitant HTN? At goal 122/80  Nightly foot checks? Yes    Patient would like to get off Metformin.  I discussed checking A1c level in 3 months we will decide at that point if we can take him off Metformin.  Patient reports that he has changed his diet to diabetic friendly and has been going to the gym regularly.  He reports a weight loss of approximately 25 pounds since January.  I congratulated him on his efforts and encouraged him to continue up his healthy lifestyle modifications.  Patient was agreeable with this plan of care.

## 2021-07-21 NOTE — ASSESSMENT & PLAN NOTE
Stable. Monitoring BP at home. Currently taking lisinopril 10 mg tablet as directed.    Denies lightheadedness, vision changes, headache, palpitations or leg swelling.    Patient did discuss wanting to get off all medications the future.  Did discuss with him reevaluating A1c level in 3 months and if it is at goal we will take him off Metformin an additional 3 months recheck all labs and discuss taking him off lisinopril and atorvastatin at that time.  Patient was agreeable with this plan of care.

## 2021-09-20 ENCOUNTER — OFFICE VISIT (OUTPATIENT)
Dept: URGENT CARE | Facility: PHYSICIAN GROUP | Age: 61
End: 2021-09-20
Payer: COMMERCIAL

## 2021-09-20 VITALS
WEIGHT: 195 LBS | HEIGHT: 67 IN | OXYGEN SATURATION: 97 % | SYSTOLIC BLOOD PRESSURE: 142 MMHG | TEMPERATURE: 96.9 F | BODY MASS INDEX: 30.61 KG/M2 | HEART RATE: 85 BPM | DIASTOLIC BLOOD PRESSURE: 96 MMHG | RESPIRATION RATE: 12 BRPM

## 2021-09-20 DIAGNOSIS — I10 ESSENTIAL HYPERTENSION: ICD-10-CM

## 2021-09-20 DIAGNOSIS — E11.65 TYPE 2 DIABETES MELLITUS WITH HYPERGLYCEMIA, WITHOUT LONG-TERM CURRENT USE OF INSULIN (HCC): ICD-10-CM

## 2021-09-20 DIAGNOSIS — E78.5 DYSLIPIDEMIA: ICD-10-CM

## 2021-09-20 PROCEDURE — 99999 PR NO CHARGE: CPT | Performed by: PHYSICIAN ASSISTANT

## 2021-09-20 RX ORDER — LISINOPRIL 10 MG/1
10 TABLET ORAL DAILY
Qty: 90 TABLET | Refills: 0 | Status: SHIPPED | OUTPATIENT
Start: 2021-09-20 | End: 2021-11-08 | Stop reason: SDUPTHER

## 2021-09-20 RX ORDER — ATORVASTATIN CALCIUM 10 MG/1
10 TABLET, FILM COATED ORAL DAILY
Qty: 90 TABLET | Refills: 0 | Status: SHIPPED | OUTPATIENT
Start: 2021-09-20 | End: 2021-11-08 | Stop reason: SDUPTHER

## 2021-09-20 ASSESSMENT — FIBROSIS 4 INDEX: FIB4 SCORE: 1.03

## 2021-11-08 ENCOUNTER — OFFICE VISIT (OUTPATIENT)
Dept: MEDICAL GROUP | Facility: PHYSICIAN GROUP | Age: 61
End: 2021-11-08
Payer: COMMERCIAL

## 2021-11-08 ENCOUNTER — HOSPITAL ENCOUNTER (OUTPATIENT)
Dept: LAB | Facility: MEDICAL CENTER | Age: 61
End: 2021-11-08
Attending: NURSE PRACTITIONER
Payer: COMMERCIAL

## 2021-11-08 VITALS
SYSTOLIC BLOOD PRESSURE: 108 MMHG | OXYGEN SATURATION: 95 % | WEIGHT: 186 LBS | HEIGHT: 67 IN | BODY MASS INDEX: 29.19 KG/M2 | TEMPERATURE: 98.6 F | RESPIRATION RATE: 16 BRPM | HEART RATE: 90 BPM | DIASTOLIC BLOOD PRESSURE: 78 MMHG

## 2021-11-08 DIAGNOSIS — Z23 NEED FOR VACCINATION: ICD-10-CM

## 2021-11-08 DIAGNOSIS — M54.9 MID BACK PAIN, CHRONIC: ICD-10-CM

## 2021-11-08 DIAGNOSIS — Z20.822 SUSPECTED COVID-19 VIRUS INFECTION: ICD-10-CM

## 2021-11-08 DIAGNOSIS — G89.29 MID BACK PAIN, CHRONIC: ICD-10-CM

## 2021-11-08 DIAGNOSIS — E11.65 TYPE 2 DIABETES MELLITUS WITH HYPERGLYCEMIA, WITHOUT LONG-TERM CURRENT USE OF INSULIN (HCC): ICD-10-CM

## 2021-11-08 DIAGNOSIS — E78.5 DYSLIPIDEMIA: ICD-10-CM

## 2021-11-08 DIAGNOSIS — I10 ESSENTIAL HYPERTENSION: ICD-10-CM

## 2021-11-08 LAB — SARS-COV-2 AB SERPL QL IA: REACTIVE

## 2021-11-08 PROCEDURE — 90471 IMMUNIZATION ADMIN: CPT | Performed by: NURSE PRACTITIONER

## 2021-11-08 PROCEDURE — 90686 IIV4 VACC NO PRSV 0.5 ML IM: CPT | Performed by: NURSE PRACTITIONER

## 2021-11-08 PROCEDURE — 86769 SARS-COV-2 COVID-19 ANTIBODY: CPT

## 2021-11-08 PROCEDURE — 36415 COLL VENOUS BLD VENIPUNCTURE: CPT

## 2021-11-08 PROCEDURE — 99214 OFFICE O/P EST MOD 30 MIN: CPT | Mod: 25,CS | Performed by: NURSE PRACTITIONER

## 2021-11-08 RX ORDER — ATORVASTATIN CALCIUM 10 MG/1
10 TABLET, FILM COATED ORAL DAILY
Qty: 90 TABLET | Refills: 3 | Status: SHIPPED | OUTPATIENT
Start: 2021-11-08 | End: 2022-11-21

## 2021-11-08 RX ORDER — LISINOPRIL 10 MG/1
10 TABLET ORAL DAILY
Qty: 90 TABLET | Refills: 3 | Status: SHIPPED | OUTPATIENT
Start: 2021-11-08 | End: 2022-11-21

## 2021-11-08 ASSESSMENT — FIBROSIS 4 INDEX: FIB4 SCORE: 1.05

## 2021-11-08 NOTE — ASSESSMENT & PLAN NOTE
Patient reports that he suspects he had COVID-19 back in November 2019.  Today is requesting antibody screen.  I do feel this is appropriate as he is not currently Covid vaccinated.  He denies any symptoms of Covid today.  Covid antibody screening was ordered today and patient is able to obtain a blood sample at his convenience.
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
## 2021-11-09 NOTE — ASSESSMENT & PLAN NOTE
Is a chronic condition that is well controlled on atorvastatin 10 mg tablet daily.  Patient denies any myalgias.  Patient does need refill, refill sent to pharmacy.

## 2021-11-09 NOTE — ASSESSMENT & PLAN NOTE
This is a chronic and well controlled condition.  Patient is currently taking Metformin 500 mg once daily.  He reports he is tolerating it well and denies any side side effects or adverse effects.    Patient continues to report that he would like to stop taking Metformin however I did discuss with him the risks associated with elevated A1c levels.  Patient was agreeable to continue on Metformin and will have repeat labs in 6 months.  Discussed with patient stopping Metformin if A1c level is between 5 and 5.4.  Patient was agreeable with this plan of care.
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
## 2021-12-17 ENCOUNTER — ANTICOAGULATION MONITORING (OUTPATIENT)
Dept: VASCULAR LAB | Facility: MEDICAL CENTER | Age: 61
End: 2021-12-17

## 2021-12-17 NOTE — PROGRESS NOTES
Discharged from [facility].  Transitioned to [facility] by KATIE Garcia, Clinical Pharmacist, CDE, CACP

## 2022-01-03 DIAGNOSIS — R11.0 NAUSEA: ICD-10-CM

## 2022-01-03 RX ORDER — ONDANSETRON 4 MG/1
4 TABLET, ORALLY DISINTEGRATING ORAL EVERY 8 HOURS PRN
Qty: 20 TABLET | Refills: 0 | Status: SHIPPED
Start: 2022-01-03 | End: 2022-12-01

## 2022-01-03 NOTE — TELEPHONE ENCOUNTER
Patient is requesting an rx be sent to his pharmacy for nausea since he is leaving to go on a cruise on Sunday. Scheduled tomorrow just in case. Please advise. Thanks       Requested Prescriptions     Pending Prescriptions Disp Refills   • ondansetron (ZOFRAN ODT) 4 MG TABLET DISPERSIBLE 20 Tablet 0     Sig: Take 1 Tablet by mouth every 8 hours as needed for Nausea.

## 2022-05-20 ENCOUNTER — TELEPHONE (OUTPATIENT)
Dept: HEMATOLOGY ONCOLOGY | Facility: MEDICAL CENTER | Age: 62
End: 2022-05-20
Payer: COMMERCIAL

## 2022-05-20 NOTE — TELEPHONE ENCOUNTER
"Patient calling to request a \"Cancer Screening\" appointment - someone to look at his skin to tell him if he has signs of cancer.  I told the patient that our clinic ([facility]) policy is that we need a referral - from a PCP, dermatologist, any provider.    Patient expressed frustration with this process.  He stated his insurance is \"open\", and does not require referrals.  I explained it is the clinic policy, and has nothing to do with insurance.  Again, patient expressed much frustration, asking \"don't you want any new business?\"  and then stated he will go elsewhere ([facility]),   "

## 2022-06-20 ENCOUNTER — HOSPITAL ENCOUNTER (OUTPATIENT)
Dept: LAB | Facility: MEDICAL CENTER | Age: 62
End: 2022-06-20
Attending: PHYSICIAN ASSISTANT
Payer: COMMERCIAL

## 2022-06-20 ENCOUNTER — HOSPITAL ENCOUNTER (OUTPATIENT)
Facility: MEDICAL CENTER | Age: 62
End: 2022-06-20
Attending: NURSE PRACTITIONER
Payer: COMMERCIAL

## 2022-06-20 ENCOUNTER — OFFICE VISIT (OUTPATIENT)
Dept: MEDICAL GROUP | Facility: PHYSICIAN GROUP | Age: 62
End: 2022-06-20
Payer: COMMERCIAL

## 2022-06-20 VITALS
RESPIRATION RATE: 16 BRPM | SYSTOLIC BLOOD PRESSURE: 126 MMHG | HEIGHT: 67 IN | OXYGEN SATURATION: 96 % | BODY MASS INDEX: 30.98 KG/M2 | DIASTOLIC BLOOD PRESSURE: 80 MMHG | TEMPERATURE: 98 F | WEIGHT: 197.4 LBS | HEART RATE: 75 BPM

## 2022-06-20 DIAGNOSIS — L98.9 SKIN LESIONS: ICD-10-CM

## 2022-06-20 DIAGNOSIS — D72.9 ABNORMAL WBC COUNT: ICD-10-CM

## 2022-06-20 DIAGNOSIS — E11.65 TYPE 2 DIABETES MELLITUS WITH HYPERGLYCEMIA, WITHOUT LONG-TERM CURRENT USE OF INSULIN (HCC): ICD-10-CM

## 2022-06-20 DIAGNOSIS — E78.5 DYSLIPIDEMIA: ICD-10-CM

## 2022-06-20 DIAGNOSIS — I10 ESSENTIAL HYPERTENSION: ICD-10-CM

## 2022-06-20 LAB — PSA SERPL-MCNC: 0.83 NG/ML (ref 0–4)

## 2022-06-20 PROCEDURE — 99214 OFFICE O/P EST MOD 30 MIN: CPT | Performed by: NURSE PRACTITIONER

## 2022-06-20 PROCEDURE — 36415 COLL VENOUS BLD VENIPUNCTURE: CPT

## 2022-06-20 PROCEDURE — 82043 UR ALBUMIN QUANTITATIVE: CPT

## 2022-06-20 PROCEDURE — 82570 ASSAY OF URINE CREATININE: CPT

## 2022-06-20 PROCEDURE — 84153 ASSAY OF PSA TOTAL: CPT

## 2022-06-20 RX ORDER — SILDENAFIL CITRATE 20 MG/1
TABLET ORAL
COMMUNITY

## 2022-06-20 ASSESSMENT — PATIENT HEALTH QUESTIONNAIRE - PHQ9: CLINICAL INTERPRETATION OF PHQ2 SCORE: 0

## 2022-06-20 ASSESSMENT — FIBROSIS 4 INDEX: FIB4 SCORE: 1.05

## 2022-06-20 NOTE — PROGRESS NOTES
Chief Complaint   Patient presents with   • Referral Needed     Skin screening for blemishes on skin       Subjective:     HPI:   Ryan Nunez is a 61 y.o. male here to discuss the evaluation and management of:      Skin lesions  This is a newly assessed condition.  Patient referral to dermatology due to multiple skin lesions on his bilateral upper extremities, face, top of head, and back that have been changing in size, shape, and color.  He reports that the ones on the top of his head, face, and upper extremities are mostly flaky in nature.  He has multiple scattered skin lesions on bilateral upper shoulders on his back that are geographic and hyperpigmentation that have been getting larger over the past few years.    He does report large sun exposure throughout his years with minimal sunscreen use.      Essential hypertension  Chronic well-controlled.  Blood pressure today in clinic is 126/80.  Patient is currently taking lisinopril 10 mg tablet daily.  He denies any lightheadedness, vision changes, headaches, palpitations, leg swelling, or chest pain.    Patient is up-to-date with labs.    Dyslipidemia  Chronic well-controlled condition.  Patient is currently on atorvastatin 10 mg tablet daily and denies any myalgias.  Patient denies any refills at this time.  Patient is up-to-date with labs.        ROS:  Gen: no fevers/chills, no changes in weight  Skin: Positive per HPI  Pulm: no sob, no cough  CV: no chest pain, no palpitations  GI: no nausea/vomiting, no diarrhea  MSk: no myalgias  Neuro: no headaches, no numbness/tingling      No Known Allergies    Current medicines (including changes today)  Current Outpatient Medications   Medication Sig Dispense Refill   • sildenafil (REVATIO) 20 MG tablet Sildenafil tablets 20 mg   Take 1-5 tablets 30 minutes prior to intercourse on empty stomach, max dose of 100mg, must wait 24H between doses.     • metFORMIN (GLUCOPHAGE) 500 MG Tab Take 1 Tablet by mouth  "every day. 90 Tablet 3   • lisinopril (PRINIVIL) 10 MG Tab Take 1 Tablet by mouth every day. 90 Tablet 3   • atorvastatin (LIPITOR) 10 MG Tab Take 1 Tablet by mouth every day. 90 Tablet 3   • Cholecalciferol (VITAMIN D3 PO) Take 1 Tab by mouth every morning.     • Multiple Vitamins-Minerals (MULTIVITAMIN ADULT PO) Take 1 Tab by mouth every morning.     • omeprazole (PRILOSEC OTC) 20 MG tablet Take 20 mg by mouth every day.     • ondansetron (ZOFRAN ODT) 4 MG TABLET DISPERSIBLE Take 1 Tablet by mouth every 8 hours as needed for Nausea. (Patient not taking: Reported on 6/20/2022) 20 Tablet 0     No current facility-administered medications for this visit.          Objective:       /80 (BP Location: Right arm)   Pulse 75   Temp 36.7 °C (98 °F) (Temporal)   Resp 16   Ht 1.689 m (5' 6.5\")   Wt 89.5 kg (197 lb 6.4 oz)   SpO2 96%  Body mass index is 31.38 kg/m².    Physical Exam:  Constitutional: Well-developed and well-nourished male in NAD. Not diaphoretic. No distress.   Skin: Multiple skin lesions that are dry, flaky, with salmon-colored pigmentation areas face, top of head, and arms.  Findings most likely consistent with actinic keratoses.  Upper shoulders: Multiple scattered hyperpigmented heated skin lesions that are geographic in appearance ranging from 3mm to 1.5 cm in circumference.  Cardiovascular: Regular rate and rhythm without murmur. Radial pulses are intact and equal bilaterally.  Pulmonary: Clear to ausculation. Normal effort. No rales, ronchi, or wheezing.  Abdomen: Soft, non tender, and without distention. Active bowel sounds in all four quadrants.   Extremities: No cyanosis, clubbing, erythema, nor edema.   Neurological: Alert and oriented x 3.   Psychiatric:  Behavior, mood, and affect are appropriate.      Assessment and Plan:     The following treatment plan was discussed:  Reviewed previous labs with patient answered all questions.  Patient is due for labs in the fall.  Lab orders have " Awake been placed as indicated below.    1. Skin lesions  New condition.  Findings during physical exam are consistent with a actinic keratosis and seborrheic keratoses.  Patient requests referral to dermatology.  He feels appropriate referrals placed today.  Hands were provided in discharge instructions.  - Referral to Dermatology    2. Type 2 diabetes mellitus with hyperglycemia, without long-term current use of insulin (HCC)  Chronic stable condition.  Patient will be due for labs in fall.  Lab orders have been placed indicated below.  Patient is due for microalbumin.  Clinically today.  Patient is also due for monofilament exam however he declined to see currently has his shoes on.  We will do exam in the fall.  Patient is agreeable to this.  Discussed appropriate diet lifestyle modifications to help with glycemic control.  If A1c level remains stable may consider discontinuing metformin.  - Comp Metabolic Panel; Future  - HEMOGLOBIN A1C; Future  - Microalbumin Creat Ratio Urine (Clinic Collect); Future    3. Dyslipidemia  Chronic well-controlled.  Patient will continue on atorvastatin 10 mg daily.  No refills needed at this time.  - Lipid Profile; Future    4. Abnormal WBC count  - CBC WITH DIFFERENTIAL; Future    5. Essential hypertension  Chronic well-controlled condition.  No refills needed.  Patient will continue on lisinopril.  Discussed appropriate diet lifestyle modifications.  Encouraged at least 150 minutes of exercise per week.      Any change or worsening of signs or symptoms, patient encouraged to follow-up or report to urgent care or emergency room for further evaluation. Patient verbalizes understanding and agrees.    Follow-Up: Return in about 20 weeks (around 11/7/2022).      PLEASE NOTE: This dictation was created using voice recognition software. I have made every reasonable attempt to correct obvious errors, but I expect that there are errors of grammar and possibly content that I did not discover  before finalizing the note.

## 2022-06-20 NOTE — ASSESSMENT & PLAN NOTE
This is a newly assessed condition.  Patient referral to dermatology due to multiple skin lesions on his bilateral upper extremities, face, top of head, and back that have been changing in size, shape, and color.  He reports that the ones on the top of his head, face, and upper extremities are mostly flaky in nature.  He has multiple scattered skin lesions on bilateral upper shoulders on his back that are geographic and hyperpigmentation that have been getting larger over the past few years.    He does report large sun exposure throughout his years with minimal sunscreen use.

## 2022-06-21 LAB
CREAT UR-MCNC: 43.71 MG/DL
MICROALBUMIN UR-MCNC: <1.2 MG/DL
MICROALBUMIN/CREAT UR: NORMAL MG/G (ref 0–30)

## 2022-06-21 NOTE — ASSESSMENT & PLAN NOTE
Chronic well-controlled condition.  Patient is currently on atorvastatin 10 mg tablet daily and denies any myalgias.  Patient denies any refills at this time.  Patient is up-to-date with labs.

## 2022-06-21 NOTE — ASSESSMENT & PLAN NOTE
Chronic well-controlled.  Blood pressure today in clinic is 126/80.  Patient is currently taking lisinopril 10 mg tablet daily.  He denies any lightheadedness, vision changes, headaches, palpitations, leg swelling, or chest pain.    Patient is up-to-date with labs.

## 2022-08-04 ENCOUNTER — APPOINTMENT (RX ONLY)
Dept: URBAN - METROPOLITAN AREA CLINIC 31 | Facility: CLINIC | Age: 62
Setting detail: DERMATOLOGY
End: 2022-08-04

## 2022-08-04 DIAGNOSIS — D18.0 HEMANGIOMA: ICD-10-CM

## 2022-08-04 DIAGNOSIS — D22 MELANOCYTIC NEVI: ICD-10-CM

## 2022-08-04 DIAGNOSIS — L81.4 OTHER MELANIN HYPERPIGMENTATION: ICD-10-CM

## 2022-08-04 DIAGNOSIS — L82.1 OTHER SEBORRHEIC KERATOSIS: ICD-10-CM

## 2022-08-04 DIAGNOSIS — Z71.89 OTHER SPECIFIED COUNSELING: ICD-10-CM

## 2022-08-04 DIAGNOSIS — L57.0 ACTINIC KERATOSIS: ICD-10-CM

## 2022-08-04 PROBLEM — D18.01 HEMANGIOMA OF SKIN AND SUBCUTANEOUS TISSUE: Status: ACTIVE | Noted: 2022-08-04

## 2022-08-04 PROBLEM — D22.5 MELANOCYTIC NEVI OF TRUNK: Status: ACTIVE | Noted: 2022-08-04

## 2022-08-04 PROCEDURE — 17000 DESTRUCT PREMALG LESION: CPT

## 2022-08-04 PROCEDURE — 99203 OFFICE O/P NEW LOW 30 MIN: CPT | Mod: 25

## 2022-08-04 PROCEDURE — ? LIQUID NITROGEN

## 2022-08-04 PROCEDURE — ? COUNSELING

## 2022-08-04 PROCEDURE — 17003 DESTRUCT PREMALG LES 2-14: CPT

## 2022-08-04 ASSESSMENT — LOCATION DETAILED DESCRIPTION DERM
LOCATION DETAILED: RIGHT MEDIAL ZYGOMA
LOCATION DETAILED: LEFT VENTRAL DISTAL FOREARM
LOCATION DETAILED: RIGHT SUPERIOR HELIX
LOCATION DETAILED: LEFT DORSAL INDEX METACARPOPHALANGEAL JOINT
LOCATION DETAILED: RIGHT SUPERIOR PARIETAL SCALP
LOCATION DETAILED: LEFT PROXIMAL POSTERIOR UPPER ARM
LOCATION DETAILED: RIGHT VENTRAL DISTAL FOREARM
LOCATION DETAILED: PERIUMBILICAL SKIN
LOCATION DETAILED: LEFT PROXIMAL DORSAL FOREARM
LOCATION DETAILED: INFERIOR THORACIC SPINE
LOCATION DETAILED: LEFT PROXIMAL CALF
LOCATION DETAILED: RIGHT RADIAL DORSAL HAND
LOCATION DETAILED: RIGHT SUPERIOR MEDIAL MIDBACK
LOCATION DETAILED: LEFT LATERAL SUPERIOR CHEST
LOCATION DETAILED: LEFT SUPERIOR MEDIAL MALAR CHEEK
LOCATION DETAILED: RIGHT DISTAL DORSAL FOREARM
LOCATION DETAILED: LEFT CENTRAL TEMPLE
LOCATION DETAILED: RIGHT PROXIMAL CALF
LOCATION DETAILED: RIGHT DORSAL WRIST
LOCATION DETAILED: EPIGASTRIC SKIN
LOCATION DETAILED: RIGHT LATERAL ABDOMEN
LOCATION DETAILED: RIGHT PROXIMAL POSTERIOR UPPER ARM
LOCATION DETAILED: RIGHT SUPERIOR LATERAL UPPER BACK
LOCATION DETAILED: LEFT SUPERIOR UPPER BACK
LOCATION DETAILED: LEFT DORSAL RING METACARPOPHALANGEAL JOINT
LOCATION DETAILED: LEFT SUPERIOR FOREHEAD

## 2022-08-04 ASSESSMENT — LOCATION SIMPLE DESCRIPTION DERM
LOCATION SIMPLE: LEFT FOREHEAD
LOCATION SIMPLE: RIGHT UPPER BACK
LOCATION SIMPLE: RIGHT LOWER BACK
LOCATION SIMPLE: LEFT UPPER BACK
LOCATION SIMPLE: CHEST
LOCATION SIMPLE: LEFT UPPER ARM
LOCATION SIMPLE: LEFT FOREARM
LOCATION SIMPLE: LEFT CALF
LOCATION SIMPLE: RIGHT HAND
LOCATION SIMPLE: UPPER BACK
LOCATION SIMPLE: ABDOMEN
LOCATION SIMPLE: RIGHT FOREARM
LOCATION SIMPLE: SCALP
LOCATION SIMPLE: LEFT TEMPLE
LOCATION SIMPLE: RIGHT UPPER ARM
LOCATION SIMPLE: LEFT HAND
LOCATION SIMPLE: LEFT CHEEK
LOCATION SIMPLE: RIGHT ZYGOMA
LOCATION SIMPLE: RIGHT EAR
LOCATION SIMPLE: RIGHT WRIST
LOCATION SIMPLE: RIGHT CALF

## 2022-08-04 ASSESSMENT — LOCATION ZONE DERM
LOCATION ZONE: LEG
LOCATION ZONE: HAND
LOCATION ZONE: TRUNK
LOCATION ZONE: SCALP
LOCATION ZONE: FACE
LOCATION ZONE: EAR
LOCATION ZONE: ARM

## 2022-11-10 ENCOUNTER — OFFICE VISIT (OUTPATIENT)
Dept: MEDICAL GROUP | Facility: PHYSICIAN GROUP | Age: 62
End: 2022-11-10
Payer: COMMERCIAL

## 2022-11-10 VITALS
DIASTOLIC BLOOD PRESSURE: 72 MMHG | OXYGEN SATURATION: 97 % | TEMPERATURE: 96.6 F | SYSTOLIC BLOOD PRESSURE: 118 MMHG | HEIGHT: 67 IN | BODY MASS INDEX: 30.92 KG/M2 | RESPIRATION RATE: 14 BRPM | HEART RATE: 68 BPM | WEIGHT: 197 LBS

## 2022-11-10 DIAGNOSIS — E78.5 DYSLIPIDEMIA: ICD-10-CM

## 2022-11-10 DIAGNOSIS — I10 ESSENTIAL HYPERTENSION: ICD-10-CM

## 2022-11-10 DIAGNOSIS — Z23 NEED FOR VACCINATION: ICD-10-CM

## 2022-11-10 DIAGNOSIS — E11.65 TYPE 2 DIABETES MELLITUS WITH HYPERGLYCEMIA, WITHOUT LONG-TERM CURRENT USE OF INSULIN (HCC): ICD-10-CM

## 2022-11-10 PROCEDURE — 99396 PREV VISIT EST AGE 40-64: CPT | Mod: 25 | Performed by: NURSE PRACTITIONER

## 2022-11-10 PROCEDURE — 90471 IMMUNIZATION ADMIN: CPT | Performed by: NURSE PRACTITIONER

## 2022-11-10 PROCEDURE — 90686 IIV4 VACC NO PRSV 0.5 ML IM: CPT | Performed by: NURSE PRACTITIONER

## 2022-11-10 ASSESSMENT — FIBROSIS 4 INDEX: FIB4 SCORE: 1.06

## 2022-11-10 NOTE — NON-PROVIDER
Subjective:     CC:   Chief Complaint   Patient presents with    Annual Exam     Patient is here for annual check up, only concern is has been having some post nasal drip causing some SOB for couple days,        HPI:   Ryan Nunez is a 62 y.o. male who presents for annual exam    Last Colorectal Cancer Screenin-22  Last Tdap:   Hx STDs: no    Exercise: minimal exercise, one hour walking weekly  Diet: eats meat, raises own meat source absent of hormones. Vegetables, homegrown.      He  has a past medical history of Arthritis, Asthma, Cataract, Clotting disorder (HCC), Diabetes (HCC), GERD (gastroesophageal reflux disease), Hyperlipidemia, Hypertension, and Pulmonary embolism (HCC).    He has no past medical history of Anemia, Anxiety, Arrhythmia, Blood transfusion without reported diagnosis, Cancer (HCC), CHF (congestive heart failure) (HCC), COPD (chronic obstructive pulmonary disease) (HCC), Depression, Diabetic neuropathy (HCC), Glaucoma, Heart attack (HCC), Heart murmur, HIV (human immunodeficiency virus infection) (HCC), Kidney disease, Migraine, Pulmonary emphysema (HCC), Seizure (HCC), Stroke (HCC), Substance abuse (HCC), or Thyroid disease.  He  has no past surgical history on file.    Family History   Problem Relation Age of Onset    Lung Disease Father     No Known Problems Mother     No Known Problems Sister     No Known Problems Maternal Grandmother     No Known Problems Maternal Grandfather     No Known Problems Paternal Grandmother     No Known Problems Paternal Grandfather      Social History     Tobacco Use    Smoking status: Never    Smokeless tobacco: Never   Vaping Use    Vaping Use: Never used   Substance Use Topics    Alcohol use: Yes     Comment: rarely    Drug use: Never     He  reports that he is not currently sexually active.    Patient Active Problem List    Diagnosis Date Noted    Skin lesions 2022    Mid back pain, chronic 2021    Suspected COVID-19 virus  infection 11/08/2021    Abnormal WBC count 03/25/2021    Splenomegaly 02/02/2021    Open wound of right knee 02/02/2021    Type 2 diabetes mellitus with hyperglycemia, without long-term current use of insulin (HCC) 01/05/2021    Other hyperlipidemia 01/05/2021    Essential hypertension 01/05/2021    Trauma 12/31/2020    Pulmonary embolism (HCC) 12/31/2020    Closed fracture of multiple ribs of both sides 12/31/2020    Bladder wall thickening 12/31/2020    GERD (gastroesophageal reflux disease) 12/31/2020    Dyslipidemia 02/21/2012    Elevated fasting glucose 01/20/2012    ASTHMA 01/20/2012     Current Outpatient Medications   Medication Sig Dispense Refill    sildenafil (REVATIO) 20 MG tablet Sildenafil tablets 20 mg   Take 1-5 tablets 30 minutes prior to intercourse on empty stomach, max dose of 100mg, must wait 24H between doses.      metFORMIN (GLUCOPHAGE) 500 MG Tab Take 1 Tablet by mouth every day. 90 Tablet 3    lisinopril (PRINIVIL) 10 MG Tab Take 1 Tablet by mouth every day. 90 Tablet 3    atorvastatin (LIPITOR) 10 MG Tab Take 1 Tablet by mouth every day. 90 Tablet 3    Cholecalciferol (VITAMIN D3 PO) Take 1 Tab by mouth every morning.      Multiple Vitamins-Minerals (MULTIVITAMIN ADULT PO) Take 1 Tab by mouth every morning.      omeprazole (PRILOSEC OTC) 20 MG tablet Take 1 Tablet by mouth every day.      ondansetron (ZOFRAN ODT) 4 MG TABLET DISPERSIBLE Take 1 Tablet by mouth every 8 hours as needed for Nausea. (Patient not taking: Reported on 6/20/2022) 20 Tablet 0     No current facility-administered medications for this visit.     No Known Allergies    Review of Systems   Constitutional: Negative for fever, chills and malaise/fatigue.   HENT: Negative for congestion.    Eyes: Negative for pain.   Respiratory: Negative for cough and shortness of breath.    Cardiovascular: Negative for chest pain and leg swelling.   Gastrointestinal: Negative for nausea, vomiting, abdominal pain and diarrhea.  "  Genitourinary: Negative for dysuria and hematuria.   Skin: Negative for rash.   Neurological: Negative for dizziness, focal weakness and headaches.   Endo/Heme/Allergies: Does not bruise/bleed easily.   Psychiatric/Behavioral: Negative for depression.  The patient is not nervous/anxious.      Objective:   /72   Pulse 68   Temp 35.9 °C (96.6 °F) (Temporal)   Resp 14   Ht 1.702 m (5' 7\")   Wt 89.4 kg (197 lb)   SpO2 97%   BMI 30.85 kg/m²      Wt Readings from Last 4 Encounters:   11/10/22 89.4 kg (197 lb)   06/20/22 89.5 kg (197 lb 6.4 oz)   11/08/21 84.4 kg (186 lb)   09/20/21 88.5 kg (195 lb)       A chaperone was offered to the patient during today's exam. Patient declined chaperone.    Physical Exam:  Constitutional: Well-developed and well-nourished. Not diaphoretic. No distress.   Skin: Skin is warm and dry. No rash noted.  Head: Atraumatic without lesions.  Eyes: Conjunctivae and extraocular motions are normal. Pupils are equal, round, and reactive to light. No scleral icterus.   Ears:  External ears unremarkable. Tympanic membranes clear and intact.  Nose: Nares patent. Septum midline. Turbinates without erythema nor edema. No discharge.   Mouth/Throat: Tongue normal. Oropharynx is clear and moist. Posterior pharynx without erythema or exudates.  Neck: Supple, trachea midline. Normal range of motion. No thyromegaly present. No lymphadenopathy--cervical or supraclavicular.  Cardiovascular: Regular rate and rhythm, S1 and S2 without murmur, rubs, or gallops.    Respiratory: Effort normal. Clear to auscultation throughout. No adventitious sounds.   Abdomen: Soft, non tender, and without distention. Active bowel sounds in all four quadrants. No rebound, guarding, masses or HSM.  Extremities: No cyanosis, clubbing, erythema, nor edema. Distal pulses intact and symmetric. Monofilament testing with a 10 gram force: sensation intact: intact bilaterally  Visual Inspection: Feet without maceration, " ulcers, fissures.  Pedal pulses: intact bilaterally   Musculoskeletal: All major joints AROM full in all directions without pain.  Neurological: Alert and oriented x 3. Grossly non-focal. Strength and sensation grossly intact. DTRs 2+/3 and symmetric.   Psychiatric:  Behavior, mood, and affect are appropriate.      Assessment and Plan:     There are no diagnoses linked to this encounter.    Health maintenance:     Labs per orders: CBC, CMP, lipid panel, hgb A1c  Immunizations per orders  Patient counseled about skin care, diet, supplements, and exercise.  Discussed diet and exercise, discussed dental care, pt has not been to dentist >18mo, encouraged to f/v with dentist.pt brushes, flosses daily. Discussed sun protective behavior, discussed injury prevention incl wear helmet, seatbelts, smoke detector placement, gun safety.     Follow-up:

## 2022-11-10 NOTE — PROGRESS NOTES
Subjective:      Chief Complaint   Patient presents with    Annual Exam     Patient is here for annual check up            Chief Complaint   Patient presents with    Annual Exam       Patient is here for annual check up, only concern is has been having some post nasal drip causing some SOB for couple days,          HPI:   Ryan Nunez is a 62 y.o. male who presents for annual exam     Last Colorectal Cancer Screenin-22  Vaccine history  Last Tdap: , pneumonia unknown strain , influenza vaccine annually, zoster 3/14/2022  Hx STDs: no     Exercise: minimal exercise, one hour walking weekly  Diet: eats meat, raises own meat source absent of hormones. Vegetables, homegrown.       He  has a past medical history of Arthritis, Asthma, Cataract, Clotting disorder (HCC), Diabetes (HCC), GERD (gastroesophageal reflux disease), Hyperlipidemia, Hypertension, and Pulmonary embolism (HCC).     He has no past medical history of Anemia, Anxiety, Arrhythmia, Blood transfusion without reported diagnosis, Cancer (HCC), CHF (congestive heart failure) (HCC), COPD (chronic obstructive pulmonary disease) (HCC), Depression, Diabetic neuropathy (HCC), Glaucoma, Heart attack (HCC), Heart murmur, HIV (human immunodeficiency virus infection) (HCC), Kidney disease, Migraine, Pulmonary emphysema (HCC), Seizure (HCC), Stroke (HCC), Substance abuse (HCC), or Thyroid disease.  He  has no past surgical history on file.     Family History         Family History   Problem Relation Age of Onset    Lung Disease Father      No Known Problems Mother      No Known Problems Sister      No Known Problems Maternal Grandmother      No Known Problems Maternal Grandfather      No Known Problems Paternal Grandmother      No Known Problems Paternal Grandfather           Social History            Tobacco Use    Smoking status: Never    Smokeless tobacco: Never   Vaping Use    Vaping Use: Never used   Substance Use Topics    Alcohol use: Yes        Comment: rarely    Drug use: Never      He  reports that he is not currently sexually active.          Patient Active Problem List     Diagnosis Date Noted    Skin lesions 06/20/2022    Mid back pain, chronic 11/08/2021    Suspected COVID-19 virus infection 11/08/2021    Abnormal WBC count 03/25/2021    Splenomegaly 02/02/2021    Open wound of right knee 02/02/2021    Type 2 diabetes mellitus with hyperglycemia, without long-term current use of insulin (HCC) 01/05/2021    Other hyperlipidemia 01/05/2021    Essential hypertension 01/05/2021    Trauma 12/31/2020    Pulmonary embolism (HCC) 12/31/2020    Closed fracture of multiple ribs of both sides 12/31/2020    Bladder wall thickening 12/31/2020    GERD (gastroesophageal reflux disease) 12/31/2020    Dyslipidemia 02/21/2012    Elevated fasting glucose 01/20/2012    ASTHMA 01/20/2012      Current Medications          Current Outpatient Medications   Medication Sig Dispense Refill    sildenafil (REVATIO) 20 MG tablet Sildenafil tablets 20 mg   Take 1-5 tablets 30 minutes prior to intercourse on empty stomach, max dose of 100mg, must wait 24H between doses.        metFORMIN (GLUCOPHAGE) 500 MG Tab Take 1 Tablet by mouth every day. 90 Tablet 3    lisinopril (PRINIVIL) 10 MG Tab Take 1 Tablet by mouth every day. 90 Tablet 3    atorvastatin (LIPITOR) 10 MG Tab Take 1 Tablet by mouth every day. 90 Tablet 3    Cholecalciferol (VITAMIN D3 PO) Take 1 Tab by mouth every morning.        Multiple Vitamins-Minerals (MULTIVITAMIN ADULT PO) Take 1 Tab by mouth every morning.        omeprazole (PRILOSEC OTC) 20 MG tablet Take 1 Tablet by mouth every day.        ondansetron (ZOFRAN ODT) 4 MG TABLET DISPERSIBLE Take 1 Tablet by mouth every 8 hours as needed for Nausea. (Patient not taking: Reported on 6/20/2022) 20 Tablet 0      No current facility-administered medications for this visit.         No Known Allergies     Review of Systems   Constitutional: Negative for fever,  "chills and malaise/fatigue.   HENT: Negative for congestion.    Eyes: Negative for pain.   Respiratory: Negative for cough and shortness of breath.    Cardiovascular: Negative for chest pain and leg swelling.   Gastrointestinal: Negative for nausea, vomiting, abdominal pain and diarrhea.   Genitourinary: Negative for dysuria and hematuria.   Skin: Negative for rash.   Neurological: Negative for dizziness, focal weakness and headaches.   Endo/Heme/Allergies: Does not bruise/bleed easily.   Psychiatric/Behavioral: Negative for depression.  The patient is not nervous/anxious.       Objective:   /72   Pulse 68   Temp 35.9 °C (96.6 °F) (Temporal)   Resp 14   Ht 1.702 m (5' 7\")   Wt 89.4 kg (197 lb)   SpO2 97%   BMI 30.85 kg/m²           Wt Readings from Last 4 Encounters:   11/10/22 89.4 kg (197 lb)   06/20/22 89.5 kg (197 lb 6.4 oz)   11/08/21 84.4 kg (186 lb)   09/20/21 88.5 kg (195 lb)         A chaperone was offered to the patient during today's exam. Patient declined chaperone.     Physical Exam:  Constitutional: Well-developed and well-nourished. Not diaphoretic. No distress.   Skin: Skin is warm and dry. No rash noted.  Head: Atraumatic without lesions.  Eyes: Conjunctivae and extraocular motions are normal. Pupils are equal, round, and reactive to light. No scleral icterus.   Ears:  External ears unremarkable. Tympanic membranes clear and intact.  Nose: Nares patent. Septum midline. Turbinates without erythema nor edema. No discharge.   Mouth/Throat: Tongue normal. Oropharynx is clear and moist. Posterior pharynx without erythema or exudates.  Neck: Supple, trachea midline. Normal range of motion. No thyromegaly present. No lymphadenopathy--cervical or supraclavicular.  Cardiovascular: Regular rate and rhythm, S1 and S2 without murmur, rubs, or gallops.    Respiratory: Effort normal. Clear to auscultation throughout. No adventitious sounds.   Abdomen: Soft, non tender, and without distention. Active " bowel sounds in all four quadrants. No rebound, guarding, masses or HSM.  Extremities: No cyanosis, clubbing, erythema, nor edema. Distal pulses intact and symmetric. Monofilament testing with a 10 gram force: sensation intact: intact bilaterally  Visual Inspection: Feet without maceration, ulcers, fissures.  Pedal pulses: intact bilaterally   Musculoskeletal: All major joints AROM full in all directions without pain.  Neurological: Alert and oriented x 3. Grossly non-focal. Strength and sensation grossly intact. DTRs 2+/3 and symmetric.   Psychiatric:  Behavior, mood, and affect are appropriate.        Assessment and Plan:   1. Dyslipidemia  - Comp Metabolic Panel; Future  - Lipid Profile; Future  - HEMOGLOBIN A1C; Future    2. Type 2 diabetes mellitus with hyperglycemia, without long-term current use of insulin (HCC)  - CBC WITH DIFFERENTIAL; Future  - Comp Metabolic Panel; Future    3. Essential hypertension  - CBC WITH DIFFERENTIAL; Future  - Comp Metabolic Panel; Future    4. Need for vaccination  - INFLUENZA VACCINE QUAD INJ (PF)       There are no diagnoses linked to this encounter.     Health maintenance:     Labs per orders: Overdue for labs -CBC, CMP, lipid panel, hgb A1c  Immunizations per orders  Patient counseled about skin care, diet, supplements, and exercise.  Discussed diet and exercise, discussed dental care, pt has not been to dentist >18mo, encouraged to f/v with dentist.pt brushes, flosses daily. Discussed sun protective behavior, discussed injury prevention incl wear helmet, seatbelts, smoke detector placement, gun safety.      Return in about 2 weeks (around 11/24/2022) for labs.

## 2022-11-15 NOTE — PROGRESS NOTES
Pt is being discharged from the facility. Pts IV was removed. I reviewed discharge paperwork with the pt. It has been signed and placed in chart. All questions and concerns have been answered. Meds to Bed have been delivered. I will walk pt to his rides car as soon as they get here.   yes

## 2022-11-20 DIAGNOSIS — E78.5 DYSLIPIDEMIA: ICD-10-CM

## 2022-11-20 DIAGNOSIS — I10 ESSENTIAL HYPERTENSION: ICD-10-CM

## 2022-11-21 RX ORDER — ATORVASTATIN CALCIUM 10 MG/1
TABLET, FILM COATED ORAL
Qty: 90 TABLET | Refills: 3 | Status: SHIPPED | OUTPATIENT
Start: 2022-11-21

## 2022-11-21 RX ORDER — LISINOPRIL 10 MG/1
TABLET ORAL
Qty: 90 TABLET | Refills: 3 | Status: SHIPPED | OUTPATIENT
Start: 2022-11-21

## 2022-11-21 NOTE — TELEPHONE ENCOUNTER
Received request via: Pharmacy    Was the patient seen in the last year in this department? Yes    Does the patient have an active prescription (recently filled or refills available) for medication(s) requested? No    Does the patient have detention Plus and need 100 day supply (blood pressure, diabetes and cholesterol meds only)? Patient does not have SCP      Last office Visit:11/10/2022  Last labs:04/07/2021

## 2022-11-29 ENCOUNTER — HOSPITAL ENCOUNTER (OUTPATIENT)
Dept: LAB | Facility: MEDICAL CENTER | Age: 62
End: 2022-11-29
Attending: NURSE PRACTITIONER
Payer: COMMERCIAL

## 2022-11-29 DIAGNOSIS — E78.5 DYSLIPIDEMIA: ICD-10-CM

## 2022-11-29 DIAGNOSIS — I10 ESSENTIAL HYPERTENSION: ICD-10-CM

## 2022-11-29 DIAGNOSIS — E11.65 TYPE 2 DIABETES MELLITUS WITH HYPERGLYCEMIA, WITHOUT LONG-TERM CURRENT USE OF INSULIN (HCC): ICD-10-CM

## 2022-11-29 LAB
ALBUMIN SERPL BCP-MCNC: 4.4 G/DL (ref 3.2–4.9)
ALBUMIN/GLOB SERPL: 1.6 G/DL
ALP SERPL-CCNC: 80 U/L (ref 30–99)
ALT SERPL-CCNC: 16 U/L (ref 2–50)
ANION GAP SERPL CALC-SCNC: 11 MMOL/L (ref 7–16)
AST SERPL-CCNC: 22 U/L (ref 12–45)
BASOPHILS # BLD AUTO: 0.5 % (ref 0–1.8)
BASOPHILS # BLD: 0.04 K/UL (ref 0–0.12)
BILIRUB SERPL-MCNC: 0.7 MG/DL (ref 0.1–1.5)
BUN SERPL-MCNC: 18 MG/DL (ref 8–22)
CALCIUM SERPL-MCNC: 9.4 MG/DL (ref 8.5–10.5)
CHLORIDE SERPL-SCNC: 105 MMOL/L (ref 96–112)
CHOLEST SERPL-MCNC: 154 MG/DL (ref 100–199)
CO2 SERPL-SCNC: 26 MMOL/L (ref 20–33)
CREAT SERPL-MCNC: 1.06 MG/DL (ref 0.5–1.4)
EOSINOPHIL # BLD AUTO: 0.19 K/UL (ref 0–0.51)
EOSINOPHIL NFR BLD: 2.5 % (ref 0–6.9)
ERYTHROCYTE [DISTWIDTH] IN BLOOD BY AUTOMATED COUNT: 43.8 FL (ref 35.9–50)
EST. AVERAGE GLUCOSE BLD GHB EST-MCNC: 148 MG/DL
FASTING STATUS PATIENT QL REPORTED: NORMAL
GFR SERPLBLD CREATININE-BSD FMLA CKD-EPI: 79 ML/MIN/1.73 M 2
GLOBULIN SER CALC-MCNC: 2.7 G/DL (ref 1.9–3.5)
GLUCOSE SERPL-MCNC: 150 MG/DL (ref 65–99)
HBA1C MFR BLD: 6.8 % (ref 4–5.6)
HCT VFR BLD AUTO: 48.8 % (ref 42–52)
HDLC SERPL-MCNC: 33 MG/DL
HGB BLD-MCNC: 16.1 G/DL (ref 14–18)
IMM GRANULOCYTES # BLD AUTO: 0.02 K/UL (ref 0–0.11)
IMM GRANULOCYTES NFR BLD AUTO: 0.3 % (ref 0–0.9)
LDLC SERPL CALC-MCNC: 79 MG/DL
LYMPHOCYTES # BLD AUTO: 1.59 K/UL (ref 1–4.8)
LYMPHOCYTES NFR BLD: 20.6 % (ref 22–41)
MCH RBC QN AUTO: 30.9 PG (ref 27–33)
MCHC RBC AUTO-ENTMCNC: 33 G/DL (ref 33.7–35.3)
MCV RBC AUTO: 93.7 FL (ref 81.4–97.8)
MONOCYTES # BLD AUTO: 0.54 K/UL (ref 0–0.85)
MONOCYTES NFR BLD AUTO: 7 % (ref 0–13.4)
NEUTROPHILS # BLD AUTO: 5.34 K/UL (ref 1.82–7.42)
NEUTROPHILS NFR BLD: 69.1 % (ref 44–72)
NRBC # BLD AUTO: 0 K/UL
NRBC BLD-RTO: 0 /100 WBC
PLATELET # BLD AUTO: 402 K/UL (ref 164–446)
PMV BLD AUTO: 10.3 FL (ref 9–12.9)
POTASSIUM SERPL-SCNC: 4.7 MMOL/L (ref 3.6–5.5)
PROT SERPL-MCNC: 7.1 G/DL (ref 6–8.2)
RBC # BLD AUTO: 5.21 M/UL (ref 4.7–6.1)
SODIUM SERPL-SCNC: 142 MMOL/L (ref 135–145)
TRIGL SERPL-MCNC: 210 MG/DL (ref 0–149)
WBC # BLD AUTO: 7.7 K/UL (ref 4.8–10.8)

## 2022-11-29 PROCEDURE — 80053 COMPREHEN METABOLIC PANEL: CPT

## 2022-11-29 PROCEDURE — 80061 LIPID PANEL: CPT

## 2022-11-29 PROCEDURE — 36415 COLL VENOUS BLD VENIPUNCTURE: CPT

## 2022-11-29 PROCEDURE — 83036 HEMOGLOBIN GLYCOSYLATED A1C: CPT

## 2022-11-29 PROCEDURE — 85025 COMPLETE CBC W/AUTO DIFF WBC: CPT

## 2022-12-01 ENCOUNTER — OFFICE VISIT (OUTPATIENT)
Dept: MEDICAL GROUP | Facility: PHYSICIAN GROUP | Age: 62
End: 2022-12-01
Payer: COMMERCIAL

## 2022-12-01 VITALS
BODY MASS INDEX: 30.45 KG/M2 | TEMPERATURE: 98.3 F | SYSTOLIC BLOOD PRESSURE: 130 MMHG | HEART RATE: 99 BPM | HEIGHT: 67 IN | DIASTOLIC BLOOD PRESSURE: 78 MMHG | RESPIRATION RATE: 14 BRPM | WEIGHT: 194 LBS | OXYGEN SATURATION: 94 %

## 2022-12-01 DIAGNOSIS — E78.5 DYSLIPIDEMIA: ICD-10-CM

## 2022-12-01 DIAGNOSIS — I10 ESSENTIAL HYPERTENSION: ICD-10-CM

## 2022-12-01 DIAGNOSIS — E11.65 TYPE 2 DIABETES MELLITUS WITH HYPERGLYCEMIA, WITHOUT LONG-TERM CURRENT USE OF INSULIN (HCC): ICD-10-CM

## 2022-12-01 PROCEDURE — 99214 OFFICE O/P EST MOD 30 MIN: CPT | Performed by: NURSE PRACTITIONER

## 2022-12-01 ASSESSMENT — FIBROSIS 4 INDEX: FIB4 SCORE: 0.86

## 2022-12-01 NOTE — ASSESSMENT & PLAN NOTE
The 10-year ASCVD risk score (Becky OLSEN, et al., 2019) is: 25%    Values used to calculate the score:      Age: 62 years      Sex: Male      Is Non- : No      Diabetic: Yes      Tobacco smoker: No      Systolic Blood Pressure: 130 mmHg      Is BP treated: Yes      HDL Cholesterol: 33 mg/dL      Total Cholesterol: 154 mg/dL    Chronic condition.  LDL cholesterol well controlled.  Mildly elevated triglycerides.  Patient currently on atorvastatin 10 mg tablet daily.  Patient denies myalgias.

## 2022-12-01 NOTE — PATIENT INSTRUCTIONS
Diabetes Mellitus and Foot Care  Foot care is an important part of your health, especially when you have diabetes. Diabetes may cause you to have problems because of poor blood flow (circulation) to your feet and legs, which can cause your skin to:  Become thinner and drier.  Break more easily.  Heal more slowly.  Peel and crack.  You may also have nerve damage (neuropathy) in your legs and feet, causing decreased feeling in them. This means that you may not notice minor injuries to your feet that could lead to more serious problems. Noticing and addressing any potential problems early is the best way to prevent future foot problems.  How to care for your feet  Foot hygiene  Wash your feet daily with warm water and mild soap. Do not use hot water. Then, pat your feet and the areas between your toes until they are completely dry. Do not soak your feet as this can dry your skin.  Trim your toenails straight across. Do not dig under them or around the cuticle. File the edges of your nails with an emery board or nail file.  Apply a moisturizing lotion or petroleum jelly to the skin on your feet and to dry, brittle toenails. Use lotion that does not contain alcohol and is unscented. Do not apply lotion between your toes.  Shoes and socks  Wear clean socks or stockings every day. Make sure they are not too tight. Do not wear knee-high stockings since they may decrease blood flow to your legs.  Wear shoes that fit properly and have enough cushioning. Always look in your shoes before you put them on to be sure there are no objects inside.  To break in new shoes, wear them for just a few hours a day. This prevents injuries on your feet.  Wounds, scrapes, corns, and calluses  Check your feet daily for blisters, cuts, bruises, sores, and redness. If you cannot see the bottom of your feet, use a mirror or ask someone for help.  Do not cut corns or calluses or try to remove them with medicine.  If you find a minor scrape, cut,  or break in the skin on your feet, keep it and the skin around it clean and dry. You may clean these areas with mild soap and water. Do not clean the area with peroxide, alcohol, or iodine.  If you have a wound, scrape, corn, or callus on your foot, look at it several times a day to make sure it is healing and not infected. Check for:  Redness, swelling, or pain.  Fluid or blood.  Warmth.  Pus or a bad smell.  General instructions  Do not cross your legs. This may decrease blood flow to your feet.  Do not use heating pads or hot water bottles on your feet. They may burn your skin. If you have lost feeling in your feet or legs, you may not know this is happening until it is too late.  Protect your feet from hot and cold by wearing shoes, such as at the beach or on hot pavement.  Schedule a complete foot exam at least once a year (annually) or more often if you have foot problems. If you have foot problems, report any cuts, sores, or bruises to your health care provider immediately.  Contact a health care provider if:  You have a medical condition that increases your risk of infection and you have any cuts, sores, or bruises on your feet.  You have an injury that is not healing.  You have redness on your legs or feet.  You feel burning or tingling in your legs or feet.  You have pain or cramps in your legs and feet.  Your legs or feet are numb.  Your feet always feel cold.  You have pain around a toenail.  Get help right away if:  You have a wound, scrape, corn, or callus on your foot and:  You have pain, swelling, or redness that gets worse.  You have fluid or blood coming from the wound, scrape, corn, or callus.  Your wound, scrape, corn, or callus feels warm to the touch.  You have pus or a bad smell coming from the wound, scrape, corn, or callus.  You have a fever.  You have a red line going up your leg.  Summary  Check your feet every day for cuts, sores, red spots, swelling, and blisters.  Moisturize feet and  legs daily.  Wear shoes that fit properly and have enough cushioning.  If you have foot problems, report any cuts, sores, or bruises to your health care provider immediately.  Schedule a complete foot exam at least once a year (annually) or more often if you have foot problems.  This information is not intended to replace advice given to you by your health care provider. Make sure you discuss any questions you have with your health care provider.  Document Released: 12/15/2001 Document Revised: 01/30/2019 Document Reviewed: 01/19/2018  [employer] Patient Education © 2020 [employer]

## 2022-12-01 NOTE — PROGRESS NOTES
Chief Complaint   Patient presents with    Lab Results       Subjective:     HPI:   Ryan Nunez is a 62 y.o. male here to discuss the evaluation and management of:      Dyslipidemia  The 10-year ASCVD risk score (Becky OLSEN, et al., 2019) is: 25%    Values used to calculate the score:      Age: 62 years      Sex: Male      Is Non- : No      Diabetic: Yes      Tobacco smoker: No      Systolic Blood Pressure: 130 mmHg      Is BP treated: Yes      HDL Cholesterol: 33 mg/dL      Total Cholesterol: 154 mg/dL    Chronic condition.  LDL cholesterol well controlled.  Mildly elevated triglycerides.  Patient currently on atorvastatin 10 mg tablet daily.  Patient denies myalgias.    Essential hypertension  Chronic well-controlled condition.  Patient is up-to-date with labs.  Currently on lisinopril 10 mg tablet daily.  Patient denies any refills today.  Patient denies any chest pain, shortness of breath, vision changes, or leg swelling.    Type 2 diabetes mellitus with hyperglycemia, without long-term current use of insulin (HCC)  Chronic well-controlled condition.  Recent A1c level 6.8%.  Patient is currently on metformin 500 mg daily.  Patient denies any hyper or hypoglycemic symptoms.  Fasting blood sugar still elevated at 150.  Patient indicates he saw ophthalmology today and had normal retinal screen.  We will be requesting records.  Patient is up-to-date on his monofilament exam.    Patient is appropriately on statin and ACE      ROS:  Gen: no fevers/chills, no changes in weight  Pulm: no sob, no cough  CV: no chest pain, no palpitations  GI: no nausea/vomiting, no diarrhea  MSk: no myalgias  Neuro: no headaches, no numbness/tingling      No Known Allergies    Current medicines (including changes today)  Current Outpatient Medications   Medication Sig Dispense Refill    metFORMIN (GLUCOPHAGE) 500 MG Tab Take 1 Tablet by mouth every day. 90 Tablet 3    lisinopril (PRINIVIL) 10 MG Tab TAKE  "ONE TABLET BY MOUTH DAILY 90 Tablet 3    atorvastatin (LIPITOR) 10 MG Tab TAKE ONE TABLET BY MOUTH DAILY 90 Tablet 3    sildenafil (REVATIO) 20 MG tablet       Cholecalciferol (VITAMIN D3 PO) Take 1 Tab by mouth every morning.      Multiple Vitamins-Minerals (MULTIVITAMIN ADULT PO) Take 1 Tab by mouth every morning.      omeprazole (PRILOSEC OTC) 20 MG tablet Take 1 Tablet by mouth every day.       No current facility-administered medications for this visit.          Objective:       /78 (BP Location: Left arm, Patient Position: Sitting, BP Cuff Size: Adult)   Pulse 99   Temp 36.8 °C (98.3 °F) (Temporal)   Resp 14   Ht 1.702 m (5' 7\")   Wt 88 kg (194 lb)   SpO2 94%  Body mass index is 30.38 kg/m².    Physical Exam:  Constitutional: Well-developed and well-nourished male in NAD. Not diaphoretic. No distress.   Skin: warm, dry, intact  Cardiovascular: Regular rate and rhythm without murmur. Radial pulses are intact and equal bilaterally.  Pulmonary: Clear to ausculation. Normal effort. No rales, ronchi, or wheezing.  Abdomen: Soft, non tender, and without distention. Active bowel sounds in all four quadrants.   Extremities: No cyanosis, clubbing, erythema, nor edema.   Neurological: Alert and oriented x 3.   Psychiatric:  Behavior, mood, and affect are appropriate.      Assessment and Plan:     The following treatment plan was discussed:  I have reviewed labs with patient and answered all questions.    1. Type 2 diabetes mellitus with hyperglycemia, without long-term current use of insulin (HCC)  Diabetes currently controlled.  Continue on metformin 500 mg once daily.  Refill sent to pharmacy.  Patient also taking statin, ACE-I. Labwork as indicated.  Diabetic foot exam and eye exams up to date.  Discussed the importance of healthy diet lifestyle modifications to help improve and control diabetes.  Discussed risks associated with micro and macro vascular damage that can occur with diabetes.    - metFORMIN " (GLUCOPHAGE) 500 MG Tab; Take 1 Tablet by mouth every day.  Dispense: 90 Tablet; Refill: 3  - Comp Metabolic Panel; Future  - HEMOGLOBIN A1C; Future    2. Dyslipidemia  Well controlled.  Labs as indicated.  Continue on atorvastatin 10 mg daily.  Discussed the importance of decreasing carbs and sugary foods. Continue to monitor.    3. Essential hypertension  Well-controlled on current regimen.  Labs as indicated.  Continue antihypertensive medications.  Discussed decreasing salt intake.  Emphasized benefits of exercise and diet. Continue to monitor.      Any change or worsening of signs or symptoms, patient encouraged to follow-up or report to urgent care or emergency room for further evaluation. Patient verbalizes understanding and agrees.    Follow-Up: Return in about 6 months (around 6/1/2023) for DM- est care.      PLEASE NOTE: This dictation was created using voice recognition software. I have made every reasonable attempt to correct obvious errors, but I expect that there are errors of grammar and possibly content that I did not discover before finalizing the note.

## 2022-12-01 NOTE — ASSESSMENT & PLAN NOTE
Chronic well-controlled condition.  Recent A1c level 6.8%.  Patient is currently on metformin 500 mg daily.  Patient denies any hyper or hypoglycemic symptoms.  Fasting blood sugar still elevated at 150.  Patient indicates he saw ophthalmology today and had normal retinal screen.  We will be requesting records.  Patient is up-to-date on his monofilament exam.    Patient is appropriately on statin and ACE

## 2022-12-01 NOTE — ASSESSMENT & PLAN NOTE
Chronic well-controlled condition.  Patient is up-to-date with labs.  Currently on lisinopril 10 mg tablet daily.  Patient denies any refills today.  Patient denies any chest pain, shortness of breath, vision changes, or leg swelling.

## 2023-11-02 NOTE — ASSESSMENT & PLAN NOTE
Quality 130: Documentation Of Current Medications In The Medical Record: Current Medications Documented Stable. Monitoring BP at home. Currently taking lisinopril 10 mg daily as directed. Denies lightheadedness, vision changes, headache, palpitations or leg swelling.  Refill lisinopril sent to pharmacy.     Detail Level: Detailed Quality 431: Preventive Care And Screening: Unhealthy Alcohol Use - Screening: Patient not identified as an unhealthy alcohol user when screened for unhealthy alcohol use using a systematic screening method Quality 47: Advance Care Plan: Advance Care Planning discussed and documented; advance care plan or surrogate decision maker documented in the medical record. Quality 226: Preventive Care And Screening: Tobacco Use: Screening And Cessation Intervention: Patient screened for tobacco use and is an ex/non-smoker